# Patient Record
Sex: MALE | Race: WHITE | NOT HISPANIC OR LATINO | Employment: OTHER | URBAN - NONMETROPOLITAN AREA
[De-identification: names, ages, dates, MRNs, and addresses within clinical notes are randomized per-mention and may not be internally consistent; named-entity substitution may affect disease eponyms.]

---

## 2023-05-26 ENCOUNTER — HOSPITAL ENCOUNTER (OUTPATIENT)
Dept: DATA CONVERSION | Facility: HOSPITAL | Age: 58
End: 2023-05-26
Attending: PAIN MEDICINE | Admitting: PAIN MEDICINE
Payer: MEDICARE

## 2023-05-26 DIAGNOSIS — M54.17 RADICULOPATHY, LUMBOSACRAL REGION: ICD-10-CM

## 2023-05-26 DIAGNOSIS — R52 PAIN, UNSPECIFIED: ICD-10-CM

## 2023-09-07 VITALS — BODY MASS INDEX: 33.39 KG/M2 | WEIGHT: 200.4 LBS | HEIGHT: 65 IN

## 2023-10-01 PROBLEM — M19.012 ARTHROPATHY OF LEFT SHOULDER: Status: ACTIVE | Noted: 2023-10-01

## 2023-10-01 PROBLEM — G62.9 NEUROPATHY: Status: ACTIVE | Noted: 2023-10-01

## 2023-10-01 PROBLEM — R20.9 SENSORY DISTURBANCE: Status: ACTIVE | Noted: 2023-10-01

## 2023-10-01 PROBLEM — M77.12 LATERAL EPICONDYLITIS OF LEFT ELBOW: Status: ACTIVE | Noted: 2023-10-01

## 2023-10-01 PROBLEM — G56.03 BILATERAL CARPAL TUNNEL SYNDROME: Status: ACTIVE | Noted: 2023-10-01

## 2023-10-01 PROBLEM — K11.7 SALIVARY SECRETION DISTURBANCE: Status: ACTIVE | Noted: 2023-10-01

## 2023-10-01 PROBLEM — M54.17 LUMBOSACRAL RADICULOPATHY: Status: ACTIVE | Noted: 2023-10-01

## 2023-10-01 PROBLEM — M47.817 LUMBOSACRAL SPONDYLOSIS: Status: ACTIVE | Noted: 2023-10-01

## 2023-10-01 PROBLEM — M51.17 INTERVERTEBRAL DISC DISORDER WITH RADICULOPATHY OF LUMBOSACRAL REGION: Status: ACTIVE | Noted: 2023-10-01

## 2023-10-01 RX ORDER — DOXYCYCLINE 100 MG/1
CAPSULE ORAL
COMMUNITY
Start: 2023-02-06 | End: 2023-11-17 | Stop reason: ALTCHOICE

## 2023-10-01 RX ORDER — ALBUTEROL SULFATE 90 UG/1
AEROSOL, METERED RESPIRATORY (INHALATION)
COMMUNITY
Start: 2023-05-10 | End: 2024-02-05 | Stop reason: SDUPTHER

## 2023-10-01 RX ORDER — PREDNISONE 20 MG/1
TABLET ORAL
COMMUNITY
Start: 2023-05-02 | End: 2023-11-17 | Stop reason: ALTCHOICE

## 2023-10-01 RX ORDER — TAMSULOSIN HYDROCHLORIDE 0.4 MG/1
CAPSULE ORAL
COMMUNITY

## 2023-10-01 RX ORDER — PHENYLPROPANOLAMINE/CLEMASTINE 75-1.34MG
TABLET, EXTENDED RELEASE ORAL
COMMUNITY
End: 2024-01-02

## 2023-10-01 RX ORDER — FLUTICASONE PROPIONATE 50 MCG
SPRAY, SUSPENSION (ML) NASAL
COMMUNITY
Start: 2023-05-02 | End: 2023-11-17 | Stop reason: ALTCHOICE

## 2023-10-01 RX ORDER — AMOXICILLIN 500 MG/1
CAPSULE ORAL
COMMUNITY
Start: 2023-02-22 | End: 2023-11-17 | Stop reason: ALTCHOICE

## 2023-10-01 RX ORDER — ATORVASTATIN CALCIUM 10 MG/1
1 TABLET, FILM COATED ORAL DAILY
COMMUNITY

## 2023-10-02 NOTE — OP NOTE
PROCEDURE DETAILS    Preoperative Diagnosis:  Radiculopathy, lumbosacral region, M54.17    Postoperative Diagnosis:  Radiculopathy, lumbosacral region, M54.17    Surgeon: Korey Valdes  Resident/Fellow/Other Assistant: None of these were associated with this case    Procedure:  1.  BILATERAL L5 TFESI    Anesthesia: No anesthesiologist associated with this case  Estimated Blood Loss: 0  Findings: NA  Additional Details: The patient has a greater than 2-month history of severe low back and leg pain.  The patient has previously had 6 weeks of conservative management with exercise therapy  and medications.  The patient is compliant with home exercises for this issue.  The pain significantly interrupts the patient's physical function.  The patient does not desire spine surgery.  The patient had undergone a previous epidural injection at  the same levels and obtained greater than 60% pain relief and functional improvement for 5 months.  His imaging is notable for bilateral neuroforaminal stenosis at L5-S1 which correlates with his symptoms so the procedure was performed at those 2 levels.   He cannot sleep for longer than 4 hours, stand for an hour, lift heavy weights, or walk for a mile due to the pain        Operative Report:   Procedure: Bilateral lumbar transforaminal epidural steroid injection under fluoroscopic guidance of the left L5 nerve root at the left L5-S1 foramen and the  right L5 nerve root at the right L5-S1 foramen  Diagnosis: Lumbosacral radiculopathy  Solution used: 1 ml of Kenalog 40mg, 3 ml normal saline, 1 ml lidocaine 2%, 5ml total. 2.5 mL per site  Anesthesia: Local  Complications: None    After informed consent was obtained, the patient was brought to the OR and placed in the prone position. The area in question was prepped and draped  in sterile fashion. An ipsilateral oblique fluoroscopic view of the lumbar spine was obtained and after 3ml of lidocaine 1% was injected into the skin at  each site, a 22-gauge Chiba needle was inserted into the skin and advanced to the 6 clock position  beneath the left and right L5 pedicles under intermittent fluoroscopic guidance. Proper needle position was confirmed via both AP and lateral fluoroscopy.  1 mL Omnipaque was injected under live fluoroscopy at each site which demonstrated appropriate  epidural and nerve root uptake and the absence of any intravascular or intrathecal spread. The local anesthetic steroid solution was then injected incrementally at each site. The 2 needles were removed. Bleeding was nil. The patient tolerated the procedure  well and was transferred to the recovery room in good condition.                        Attestation:   Note Completion:  Attending Attestation I performed the procedure without a resident         Electronic Signatures:  Korey Valdes)  (Signed 26-May-2023 19:30)   Authored: Post-Operative Note, Chart Review, Note Completion      Last Updated: 26-May-2023 19:30 by Korey Valdes)

## 2023-10-03 ENCOUNTER — OFFICE VISIT (OUTPATIENT)
Dept: PAIN MEDICINE | Facility: CLINIC | Age: 58
End: 2023-10-03
Payer: MEDICARE

## 2023-10-03 VITALS
RESPIRATION RATE: 16 BRPM | BODY MASS INDEX: 27.02 KG/M2 | SYSTOLIC BLOOD PRESSURE: 166 MMHG | HEART RATE: 77 BPM | DIASTOLIC BLOOD PRESSURE: 88 MMHG | WEIGHT: 193 LBS | HEIGHT: 71 IN

## 2023-10-03 DIAGNOSIS — M19.012 ARTHRITIS OF LEFT SHOULDER REGION: Primary | ICD-10-CM

## 2023-10-03 DIAGNOSIS — M54.17 LUMBOSACRAL NEURITIS: ICD-10-CM

## 2023-10-03 PROCEDURE — 20610 DRAIN/INJ JOINT/BURSA W/O US: CPT | Performed by: PHYSICIAN ASSISTANT

## 2023-10-03 ASSESSMENT — ENCOUNTER SYMPTOMS
CARDIOVASCULAR NEGATIVE: 1
BACK PAIN: 1
HEMATOLOGIC/LYMPHATIC NEGATIVE: 1
RESPIRATORY NEGATIVE: 1
PSYCHIATRIC NEGATIVE: 1
ENDOCRINE NEGATIVE: 1
GASTROINTESTINAL NEGATIVE: 1
CONSTITUTIONAL NEGATIVE: 1
EYES NEGATIVE: 1
ALLERGIC/IMMUNOLOGIC NEGATIVE: 1
NEUROLOGICAL NEGATIVE: 1

## 2023-10-03 ASSESSMENT — PATIENT HEALTH QUESTIONNAIRE - PHQ9
2. FEELING DOWN, DEPRESSED OR HOPELESS: NOT AT ALL
SUM OF ALL RESPONSES TO PHQ9 QUESTIONS 1 AND 2: 0
1. LITTLE INTEREST OR PLEASURE IN DOING THINGS: NOT AT ALL

## 2023-10-03 ASSESSMENT — PAIN - FUNCTIONAL ASSESSMENT: PAIN_FUNCTIONAL_ASSESSMENT: 0-10

## 2023-10-03 ASSESSMENT — COLUMBIA-SUICIDE SEVERITY RATING SCALE - C-SSRS
1. IN THE PAST MONTH, HAVE YOU WISHED YOU WERE DEAD OR WISHED YOU COULD GO TO SLEEP AND NOT WAKE UP?: NO
6. HAVE YOU EVER DONE ANYTHING, STARTED TO DO ANYTHING, OR PREPARED TO DO ANYTHING TO END YOUR LIFE?: NO
2. HAVE YOU ACTUALLY HAD ANY THOUGHTS OF KILLING YOURSELF?: NO

## 2023-10-03 ASSESSMENT — PAIN DESCRIPTION - DESCRIPTORS: DESCRIPTORS: ACHING;BURNING;SHARP;STABBING;THROBBING

## 2023-10-03 ASSESSMENT — PAIN SCALES - GENERAL: PAINLEVEL_OUTOF10: 7

## 2023-10-03 NOTE — PROGRESS NOTES
Patient ID: Luis Felipe Moreno is a 58 y.o. male.    Joint Injection/Aspiration    Date/Time: 10/3/2023 4:00 PM    Performed by: Catherine Harrell PA-C  Authorized by: Catherine Harrell PA-C    Consent:     Consent obtained:  Verbal and written    Consent given by:  Patient    Risks, benefits, and alternatives were discussed: yes      Risks discussed:  Bleeding, infection and pain    Alternatives discussed:  Alternative treatment  Universal protocol:     Patient identity confirmed:  Verbally with patient  Location:     Location:  Shoulder    Shoulder:  L glenohumeral  Anesthesia:     Anesthesia method:  None  Procedure details:     Needle gauge:  22 G    Approach:  Posterior    Steroid injected: yes (Triamcinolone)    Post-procedure details:     Dressing:  Adhesive bandage    Procedure completion:  Tolerated

## 2023-10-03 NOTE — PROGRESS NOTES
Subjective   Patient ID: Luis Felipe Moreno is a 58 y.o. male who presents for Shoulder Pain (Patient is here today for left shoulder pain.  He is scheduled to get an injection in his left shoulder.  Patient states his pain radiates down his arm into his elbow.  Patient rates his pain a 7/10 today.  Patient denied numbness or tingling.  ).  AMIRA score 40.  Alcohol screen completed, negative.    HPI    Patient is a 58-year-old male.  He has a past medical history significant for lumbosacral neuritis, left shoulder pain and left shoulder arthritis.  Patient is hoping to get scheduled for his transforaminal that was ordered at his last appointment.  A bilateral L5-S1 transforaminal epidural steroid injection was ordered and is pending  He is here today to pursue his left shoulder injection.  He just wants to get feeling well for gun season.  The left shoulder pain he rates a 7/10.  He has difficulty lifting his arm.  Difficulty doing certain things because of his left shoulder pain and he just wants to get feeling better.  He wants to be able to be active.  It should be noted that he has used prednisone, ibuprofen, and done physical therapy in the past without relief.  Review of Systems   Constitutional: Negative.    HENT: Negative.     Eyes: Negative.    Respiratory: Negative.     Cardiovascular: Negative.    Gastrointestinal: Negative.    Endocrine: Negative.    Genitourinary: Negative.    Musculoskeletal:  Positive for back pain.   Allergic/Immunologic: Negative.    Neurological: Negative.    Hematological: Negative.    Psychiatric/Behavioral: Negative.         Objective   Physical Exam  Constitutional:       Appearance: Normal appearance. He is normal weight.   HENT:      Head: Normocephalic.      Right Ear: External ear normal.      Left Ear: External ear normal.      Nose: Nose normal.      Mouth/Throat:      Mouth: Mucous membranes are moist.   Eyes:      Pupils: Pupils are equal, round, and reactive to light.    Cardiovascular:      Rate and Rhythm: Normal rate and regular rhythm.      Pulses: Normal pulses.   Pulmonary:      Effort: Pulmonary effort is normal.      Breath sounds: Normal breath sounds.   Musculoskeletal:         General: Normal range of motion.      Cervical back: Normal range of motion.      Comments: Other than pain with internal and external rotation of the left shoulder.   Skin:     General: Skin is warm and dry.   Neurological:      General: No focal deficit present.      Mental Status: He is alert and oriented to person, place, and time.   Psychiatric:         Mood and Affect: Mood normal.         Behavior: Behavior normal.         Thought Content: Thought content normal.         Judgment: Judgment normal.       Left shoulder injection done today.  Patient tolerated well.    Left shoulder injection was done today.  Patient tolerated well.  He was brought to the room and placed in a seated position.  The area was in neutral position.  It was prepped and draped.  Needle was introduced into the shoulder.  Negative aspiration was performed and the above-mentioned medication was injected into the shoulder without problem.  At this time, a transforaminal epidural steroid injection is pending.  He is hopeful to pursue this in the near future.  Upon further investigation he is scheduled for this in November.  At this time, he should follow-up 2 to 3 weeks after the injection for reevaluation.  Call the clinic sooner if necessary.  Assessment/Plan

## 2023-10-11 RX ORDER — TRIAMCINOLONE ACETONIDE 0.25 MG/G
OINTMENT TOPICAL ONCE
Status: CANCELLED | OUTPATIENT
Start: 2023-10-11 | End: 2023-10-11

## 2023-10-11 RX ORDER — BUPIVACAINE HYDROCHLORIDE 2.5 MG/ML
INJECTION, SOLUTION INFILTRATION; PERINEURAL ONCE
Status: CANCELLED | OUTPATIENT
Start: 2023-10-11 | End: 2023-10-11

## 2023-10-11 RX ORDER — BUPIVACAINE HYDROCHLORIDE 2.5 MG/ML
3 INJECTION, SOLUTION INFILTRATION; PERINEURAL ONCE
Status: SHIPPED | OUTPATIENT
Start: 2023-10-11

## 2023-11-06 ENCOUNTER — TELEPHONE (OUTPATIENT)
Dept: PAIN MEDICINE | Facility: CLINIC | Age: 58
End: 2023-11-06
Payer: MEDICARE

## 2023-11-06 NOTE — TELEPHONE ENCOUNTER
I called Luis Felipe and left a VM educating him on  about his injection on 11/17/23, he is to hold his aspirin for 6 days before and his last dose on 11/10/23, I also reviewed no vitamins or supplements for 7 days before and last does will be on 11/9/23. I asked him to call me with any questions.

## 2023-11-07 DIAGNOSIS — M54.17 LUMBOSACRAL RADICULOPATHY: Primary | ICD-10-CM

## 2023-11-17 ENCOUNTER — HOSPITAL ENCOUNTER (OUTPATIENT)
Dept: OPERATING ROOM | Facility: HOSPITAL | Age: 58
Setting detail: OUTPATIENT SURGERY
Discharge: HOME | End: 2023-11-17
Payer: MEDICARE

## 2023-11-17 ENCOUNTER — HOSPITAL ENCOUNTER (OUTPATIENT)
Dept: RADIOLOGY | Facility: HOSPITAL | Age: 58
Setting detail: OUTPATIENT SURGERY
Discharge: HOME | End: 2023-11-17
Payer: MEDICARE

## 2023-11-17 VITALS
RESPIRATION RATE: 20 BRPM | WEIGHT: 190 LBS | DIASTOLIC BLOOD PRESSURE: 80 MMHG | OXYGEN SATURATION: 98 % | BODY MASS INDEX: 26.6 KG/M2 | TEMPERATURE: 97.5 F | SYSTOLIC BLOOD PRESSURE: 159 MMHG | HEIGHT: 71 IN | HEART RATE: 72 BPM

## 2023-11-17 DIAGNOSIS — M54.17 LUMBOSACRAL RADICULOPATHY: ICD-10-CM

## 2023-11-17 PROCEDURE — 77003 FLUOROGUIDE FOR SPINE INJECT: CPT

## 2023-11-17 PROCEDURE — 64483 NJX AA&/STRD TFRM EPI L/S 1: CPT | Mod: 50 | Performed by: ANESTHESIOLOGY

## 2023-11-17 PROCEDURE — 2550000001 HC RX 255 CONTRASTS

## 2023-11-17 PROCEDURE — 2500000004 HC RX 250 GENERAL PHARMACY W/ HCPCS (ALT 636 FOR OP/ED)

## 2023-11-17 PROCEDURE — A4216 STERILE WATER/SALINE, 10 ML: HCPCS

## 2023-11-17 PROCEDURE — 2500000005 HC RX 250 GENERAL PHARMACY W/O HCPCS

## 2023-11-17 ASSESSMENT — PAIN - FUNCTIONAL ASSESSMENT
PAIN_FUNCTIONAL_ASSESSMENT: 0-10
PAIN_FUNCTIONAL_ASSESSMENT: 0-10

## 2023-11-17 ASSESSMENT — PAIN DESCRIPTION - DESCRIPTORS: DESCRIPTORS: ACHING;BURNING;SHARP;STABBING

## 2023-11-17 ASSESSMENT — PAIN SCALES - GENERAL
PAINLEVEL_OUTOF10: 5 - MODERATE PAIN
PAINLEVEL_OUTOF10: 7

## 2023-11-17 NOTE — H&P
History Of Present Illness  Luis Felipe Moreno is a 58 y.o. male presenting with low back and bilateral leg pain due to lumbar radiculopathy.  Denies changes since last office visit.  Imaging studies reviewed.  Here for bilateral L5/S1 transforaminal epidural steroid injections.  Previous injections at these levels provided significant benefit for several months..     Past Medical History  Past Medical History:   Diagnosis Date    Contusion, laceration, and hemorrhage of brainstem with loss of consciousness status unknown, initial encounter (CMS/[facility])     Brain stem injury    Syringomyelia and syringobulbia (CMS/[facility]) 06/23/2017    Syringomyelia and syringobulbia    Unspecified injury of shoulder and upper arm, unspecified arm, initial encounter     Shoulder injury       Surgical History  Past Surgical History:   Procedure Laterality Date    CARPAL TUNNEL RELEASE  02/01/2017    Neuroplasty Median Nerve At Carpal Tunnel    KNEE ARTHROSCOPY W/ DEBRIDEMENT  02/01/2017    Arthroscopy Knee    OTHER SURGICAL HISTORY  02/01/2017    Nerve Block Transforaminal Epidural Lumbar L5 - S1    OTHER SURGICAL HISTORY  02/01/2017    History Of Prior Surgery    OTHER SURGICAL HISTORY  10/14/2022    Epidural steroid injection    OTHER SURGICAL HISTORY  10/14/2022    Epidural steroid injection    OTHER SURGICAL HISTORY  09/04/2020    Epidural steroid injection    OTHER SURGICAL HISTORY  01/22/2021    Epidural steroid injection    OTHER SURGICAL HISTORY  01/21/2022    Epidural steroid injection    OTHER SURGICAL HISTORY  05/22/2020    Epidural steroid injection    SHOULDER SURGERY  09/13/2017    Shoulder Surgery        Social History  He reports that he has been smoking cigarettes. He has a 35.00 pack-year smoking history. He has never used smokeless tobacco. He reports current alcohol use. He reports that he does not use drugs.    Family History  Family History   Problem Relation Name Age of Onset    Alcohol abuse Mother      Lung cancer  Father      Cancer Father          urinary bladder        Allergies  Patient has no known allergies.    Review of Systems   All other systems reviewed and are negative.       Physical Exam  General: No acute distress.  Alert and oriented x3.  Well-nourished.  Well-developed.  Neuro: 5/5 strength distal lower extremities.  Last Recorded Vitals  There were no vitals taken for this visit.    Relevant Results             Assessment/Plan       58-year-old gentleman with lumbar radiculopathy here for bilateral L5/S1 transforaminal epidural steroid injections    Vazquez Glover MD

## 2023-11-17 NOTE — Clinical Note
Pt arrived to OR on cart. Transferred to OR table. Safety strap applied. Pt padded with pillows in prone  position. Pt prepped with Chloraprep with a fire risk of 1. Bandaids applied to injection sites. Pt. Transferred to ACS on cart. Report given to Carmel Vazquez RN.

## 2023-11-17 NOTE — OP NOTE
* No procedures listed * Operative Note     Date: 2023  [address]    Name: Luis Felipe Moreno, : 1965, Age: 58 y.o., MRN: [id number], Sex: male    Diagnosis  Lumbosacral radiculitis    Procedures  Bilateral L5/S1 transforaminal epidural steroid injection    Surgeons   Adalberto  Procedure Summary  Anesthesia: Local ASA: ASA status not filed in the log.  Anesthesia Staff: No anesthesia staff entered.  Estimated Blood Loss: 0mL  Intra-op Medications: * Intraprocedure medication information is unavailable because the case start and end events have not been set *      Intraprocedure I/O Totals       None           Specimen: No specimens collected     Staff:   Circulator: Lindsay Nielson RN; Aleyda Crawford RN  Scrub Person: Elizabeth Prajapati RN; Adiel Sutton      Indications: Luis Felipe Moreno is an 58 y.o. male who is having surgery for lumbosacral radiculopathy.  The patient was seen in the preoperative area. The risks, benefits, complications, treatment options, non-operative alternatives, expected recovery and outcomes were discussed with the patient. The possibilities of reaction to medication, pulmonary aspiration, injury to surrounding structures, bleeding, recurrent infection, the need for additional procedures, failure to diagnose a condition, and creating a complication requiring transfusion or operation were discussed with the patient. The patient concurred with the proposed plan, giving informed consent.  The site of surgery was properly noted/marked if necessary per policy.   Procedure Details: The patient was identified in the preoperative holding area.  Risks, benefits, and alternatives were reviewed with the patient.  The patient wished to proceed.  Consent was signed and sites were marked.  The patient was brought to the operating room placed in the prone position with a pillow underneath the abdomen to reduce lumbar lordosis. Time out was performed.  The patient's skin was prepped  and draped in surgical sterile fashion.  Skin and subcutaneous tissues were anesthetized with total of 3 mL of 2% lidocaine through 25-gauge needle.  22-gauge spinal needles were advanced under intermittent fluoroscopy to the bilateral L5/S1 neuroforamina.  Confirmation of proper needle placement was performed under live fluoroscopy with 2 mL of Omnipaque contrast injected which showed appropriate epidural spread without vascular uptake.  After confirmation of negative aspiration, a total of 1 mL of 2% preservative free lidocaine, 1 mL preservative-free normal saline, and 10 mg of preservative-free dexamethasone were injected through the 2 needles in equally divided doses.  The needles were removed and bandages were applied.  The patient was brought to the recovery room in good condition with no apparent complications.    Complications:  None; patient tolerated the procedure well.    Disposition: PACU - hemodynamically stable.  Condition: stable       Vazquez Glover MD

## 2023-12-05 ENCOUNTER — APPOINTMENT (OUTPATIENT)
Dept: PRIMARY CARE | Facility: CLINIC | Age: 58
End: 2023-12-05
Payer: MEDICARE

## 2024-01-02 ENCOUNTER — APPOINTMENT (OUTPATIENT)
Dept: PRIMARY CARE | Facility: CLINIC | Age: 59
End: 2024-01-02
Payer: MEDICARE

## 2024-01-02 ENCOUNTER — OFFICE VISIT (OUTPATIENT)
Dept: PRIMARY CARE | Facility: CLINIC | Age: 59
End: 2024-01-02
Payer: MEDICARE

## 2024-01-02 VITALS
HEART RATE: 80 BPM | HEIGHT: 71 IN | SYSTOLIC BLOOD PRESSURE: 181 MMHG | WEIGHT: 196 LBS | BODY MASS INDEX: 27.44 KG/M2 | DIASTOLIC BLOOD PRESSURE: 75 MMHG

## 2024-01-02 DIAGNOSIS — E78.00 HYPERCHOLESTEROLEMIA: ICD-10-CM

## 2024-01-02 DIAGNOSIS — I10 PRIMARY HYPERTENSION: Primary | ICD-10-CM

## 2024-01-02 DIAGNOSIS — N40.0 BENIGN PROSTATIC HYPERPLASIA, UNSPECIFIED WHETHER LOWER URINARY TRACT SYMPTOMS PRESENT: ICD-10-CM

## 2024-01-02 DIAGNOSIS — J44.9 CHRONIC OBSTRUCTIVE PULMONARY DISEASE, UNSPECIFIED COPD TYPE (MULTI): ICD-10-CM

## 2024-01-02 PROBLEM — S43.402A SPRAIN OF LEFT SHOULDER: Status: RESOLVED | Noted: 2017-06-09 | Resolved: 2024-01-02

## 2024-01-02 PROBLEM — G56.23 ULNAR NEUROPATHY OF BOTH UPPER EXTREMITIES: Status: RESOLVED | Noted: 2017-06-02 | Resolved: 2024-01-02

## 2024-01-02 PROBLEM — M75.122 COMPLETE TEAR OF LEFT ROTATOR CUFF: Status: RESOLVED | Noted: 2017-07-27 | Resolved: 2024-01-02

## 2024-01-02 PROBLEM — Z98.890 S/P ARTHROSCOPY OF SHOULDER: Status: RESOLVED | Noted: 2017-07-27 | Resolved: 2024-01-02

## 2024-01-02 PROCEDURE — 3078F DIAST BP <80 MM HG: CPT | Performed by: INTERNAL MEDICINE

## 2024-01-02 PROCEDURE — 99204 OFFICE O/P NEW MOD 45 MIN: CPT | Performed by: INTERNAL MEDICINE

## 2024-01-02 PROCEDURE — 3077F SYST BP >= 140 MM HG: CPT | Performed by: INTERNAL MEDICINE

## 2024-01-02 RX ORDER — LISINOPRIL 5 MG/1
5 TABLET ORAL DAILY
Qty: 30 TABLET | Refills: 11 | Status: SHIPPED | OUTPATIENT
Start: 2024-01-02 | End: 2024-02-05 | Stop reason: SDUPTHER

## 2024-01-02 ASSESSMENT — ENCOUNTER SYMPTOMS
DYSURIA: 0
ADENOPATHY: 0
COUGH: 0
ABDOMINAL PAIN: 0
NECK STIFFNESS: 0
ABDOMINAL DISTENTION: 0
CARDIOVASCULAR NEGATIVE: 1
NAUSEA: 0
GASTROINTESTINAL NEGATIVE: 1
MUSCULOSKELETAL NEGATIVE: 1
CONFUSION: 0
FEVER: 0
CHILLS: 0
NUMBNESS: 0
BACK PAIN: 0
HEMATOLOGIC/LYMPHATIC NEGATIVE: 1
LIGHT-HEADEDNESS: 0
HEADACHES: 0
CONSTITUTIONAL NEGATIVE: 1
NEUROLOGICAL NEGATIVE: 1
AGITATION: 0
PALPITATIONS: 0
EYE DISCHARGE: 0
WHEEZING: 0
JOINT SWELLING: 0
ENDOCRINE NEGATIVE: 1
DIARRHEA: 0
ALLERGIC/IMMUNOLOGIC NEGATIVE: 1
VOMITING: 0
CONSTIPATION: 0
PSYCHIATRIC NEGATIVE: 1
RESPIRATORY NEGATIVE: 1
EYES NEGATIVE: 1
SHORTNESS OF BREATH: 0

## 2024-01-02 NOTE — PROGRESS NOTES
Subjective   Patient ID: Luis Felipe Moreno is a 58 y.o. male who presents for Follow-up (New patient visit).  Here to establish  Feels fine        Review of Systems   Constitutional: Negative.  Negative for chills and fever.   HENT: Negative.  Negative for congestion.    Eyes: Negative.  Negative for discharge.   Respiratory: Negative.  Negative for cough, shortness of breath and wheezing.    Cardiovascular: Negative.  Negative for chest pain, palpitations and leg swelling.   Gastrointestinal: Negative.  Negative for abdominal distention, abdominal pain, constipation, diarrhea, nausea and vomiting.   Endocrine: Negative.    Genitourinary: Negative.  Negative for dysuria and urgency.   Musculoskeletal: Negative.  Negative for back pain, joint swelling and neck stiffness.   Skin: Negative.  Negative for rash.   Allergic/Immunologic: Negative.  Negative for immunocompromised state.   Neurological: Negative.  Negative for light-headedness, numbness and headaches.   Hematological: Negative.  Negative for adenopathy.   Psychiatric/Behavioral: Negative.  Negative for agitation, behavioral problems and confusion.    All other systems reviewed and are negative.      Objective   Physical Exam  Vitals reviewed.   Constitutional:       General: He is not in acute distress.     Appearance: Normal appearance.   HENT:      Head: Normocephalic and atraumatic.      Nose: Nose normal.   Eyes:      Conjunctiva/sclera: Conjunctivae normal.      Pupils: Pupils are equal, round, and reactive to light.   Neck:      Vascular: No carotid bruit.   Cardiovascular:      Rate and Rhythm: Normal rate and regular rhythm.      Pulses: Normal pulses.      Heart sounds:      No gallop.   Pulmonary:      Effort: Pulmonary effort is normal. No respiratory distress.      Breath sounds: Normal breath sounds. No wheezing.   Abdominal:      General: Bowel sounds are normal.      Palpations: Abdomen is soft.      Tenderness: There is no abdominal tenderness.  "  Musculoskeletal:         General: Normal range of motion.      Cervical back: Normal range of motion. No rigidity.   Lymphadenopathy:      Cervical: No cervical adenopathy.   Skin:     General: Skin is warm.      Findings: No rash.   Neurological:      General: No focal deficit present.      Mental Status: He is alert and oriented to person, place, and time.   Psychiatric:         Mood and Affect: Mood normal.         Behavior: Behavior normal.       BP (!) 181/75 (BP Location: Left arm)   Pulse 80   Ht 1.803 m (5' 11\")   Wt 88.9 kg (196 lb)   BMI 27.34 kg/m²    No results found for: \"CBCDIF\", \"CMPLAS\", \"PSA\", \"LASA\", \"HGBA1C\"  Assessment/Plan   Problem List Items Addressed This Visit       Primary hypertension - Primary    Relevant Medications    lisinopril 5 mg tablet    Other Relevant Orders    CBC and Auto Differential    Comprehensive Metabolic Panel    Benign prostatic hyperplasia    Relevant Orders    CBC and Auto Differential    Comprehensive Metabolic Panel    Hypercholesterolemia    Relevant Orders    CBC and Auto Differential    Comprehensive Metabolic Panel    Chronic obstructive pulmonary disease (CMS/HCC)    Relevant Orders    CBC and Auto Differential    Comprehensive Metabolic Panel    Complete Pulmonary Function Test Pre/Post Bronchodialator (Spirometry Pre/Post/DLCO/Lung Volumes)    XR chest 2 views      MONITOR BP   GOAL BP LOWER THAN 130/80  LOW SALT  EXERCISE DAILY    I have counselled patient about need for smoking/tobacco cessation and how I can support efforts when patient is ready to quit.  Discussed nicotine replacement therapy, Varenicline, Bupropion, hypnosis, support groups, and acupuncture as potential options.  I spend 4 minutes counseling.    Pt reports last colonoscopy 6 years ago normal      MDM    1) COMPLEXITY: 1 OR MORE CHRONIC CONDITION WITH EXACERBATION, OR PROGRESSION OR SIDE EFFECT OF TREATMENT ADDRESSED  2)DATA: TESTS INTERPRETED AND OR ORDERED, TOOK INDEPENDENT " HISTORY OR RECORDS REVIEWED  3)RISK: MODERATE RISK DUE TO NATURE OF MEDICAL CONDITIONS/COMORBIDITY OR MEDICATIONS ORDERED OR SURGICAL OR PROCEDURE REFERRAL, .

## 2024-01-05 ENCOUNTER — HOSPITAL ENCOUNTER (OUTPATIENT)
Dept: RADIOLOGY | Facility: HOSPITAL | Age: 59
Discharge: HOME | End: 2024-01-05
Payer: MEDICARE

## 2024-01-05 ENCOUNTER — HOSPITAL ENCOUNTER (OUTPATIENT)
Dept: RESPIRATORY THERAPY | Facility: HOSPITAL | Age: 59
Discharge: HOME | End: 2024-01-05
Payer: MEDICARE

## 2024-01-05 DIAGNOSIS — J44.9 CHRONIC OBSTRUCTIVE PULMONARY DISEASE, UNSPECIFIED COPD TYPE (MULTI): ICD-10-CM

## 2024-01-05 PROCEDURE — 71046 X-RAY EXAM CHEST 2 VIEWS: CPT | Performed by: RADIOLOGY

## 2024-01-05 PROCEDURE — 71046 X-RAY EXAM CHEST 2 VIEWS: CPT

## 2024-01-09 ENCOUNTER — OUTSIDE PROCEDURE (OUTPATIENT)
Dept: PRIMARY CARE | Facility: CLINIC | Age: 59
End: 2024-01-09
Payer: MEDICARE

## 2024-01-09 DIAGNOSIS — J44.9 CHRONIC OBSTRUCTIVE PULMONARY DISEASE, UNSPECIFIED COPD TYPE (MULTI): Primary | ICD-10-CM

## 2024-01-09 PROCEDURE — 94060 EVALUATION OF WHEEZING: CPT | Performed by: INTERNAL MEDICINE

## 2024-02-05 ENCOUNTER — OFFICE VISIT (OUTPATIENT)
Dept: PRIMARY CARE | Facility: CLINIC | Age: 59
End: 2024-02-05
Payer: MEDICARE

## 2024-02-05 VITALS
WEIGHT: 192 LBS | HEART RATE: 69 BPM | SYSTOLIC BLOOD PRESSURE: 159 MMHG | DIASTOLIC BLOOD PRESSURE: 84 MMHG | HEIGHT: 71 IN | BODY MASS INDEX: 26.88 KG/M2

## 2024-02-05 DIAGNOSIS — J44.9 CHRONIC OBSTRUCTIVE PULMONARY DISEASE, UNSPECIFIED COPD TYPE (MULTI): ICD-10-CM

## 2024-02-05 DIAGNOSIS — E78.00 HYPERCHOLESTEROLEMIA: ICD-10-CM

## 2024-02-05 DIAGNOSIS — Z00.00 ROUTINE GENERAL MEDICAL EXAMINATION AT HEALTH CARE FACILITY: Primary | ICD-10-CM

## 2024-02-05 DIAGNOSIS — I10 PRIMARY HYPERTENSION: ICD-10-CM

## 2024-02-05 DIAGNOSIS — G47.00 INSOMNIA, UNSPECIFIED TYPE: ICD-10-CM

## 2024-02-05 DIAGNOSIS — Z12.5 SPECIAL SCREENING FOR MALIGNANT NEOPLASM OF PROSTATE: ICD-10-CM

## 2024-02-05 DIAGNOSIS — Z23 NEED FOR VACCINATION: ICD-10-CM

## 2024-02-05 DIAGNOSIS — Z00.00 HEALTHCARE MAINTENANCE: ICD-10-CM

## 2024-02-05 PROCEDURE — 3079F DIAST BP 80-89 MM HG: CPT | Performed by: INTERNAL MEDICINE

## 2024-02-05 PROCEDURE — 99214 OFFICE O/P EST MOD 30 MIN: CPT | Performed by: INTERNAL MEDICINE

## 2024-02-05 PROCEDURE — 3077F SYST BP >= 140 MM HG: CPT | Performed by: INTERNAL MEDICINE

## 2024-02-05 PROCEDURE — G0439 PPPS, SUBSEQ VISIT: HCPCS | Performed by: INTERNAL MEDICINE

## 2024-02-05 RX ORDER — UMECLIDINIUM BROMIDE AND VILANTEROL TRIFENATATE 62.5; 25 UG/1; UG/1
1 POWDER RESPIRATORY (INHALATION) DAILY
Qty: 30 EACH | Refills: 11 | Status: SHIPPED | OUTPATIENT
Start: 2024-02-05 | End: 2025-02-04

## 2024-02-05 RX ORDER — LISINOPRIL 10 MG/1
10 TABLET ORAL DAILY
Qty: 90 TABLET | Refills: 3 | Status: SHIPPED | OUTPATIENT
Start: 2024-02-05 | End: 2025-02-04

## 2024-02-05 RX ORDER — AMITRIPTYLINE HYDROCHLORIDE 10 MG/1
10 TABLET, FILM COATED ORAL NIGHTLY
Qty: 30 TABLET | Refills: 11 | Status: SHIPPED | OUTPATIENT
Start: 2024-02-05 | End: 2024-04-16 | Stop reason: SDUPTHER

## 2024-02-05 RX ORDER — ALBUTEROL SULFATE 90 UG/1
2 AEROSOL, METERED RESPIRATORY (INHALATION) EVERY 6 HOURS PRN
Qty: 18 G | Refills: 11 | Status: SHIPPED | OUTPATIENT
Start: 2024-02-05 | End: 2025-02-04

## 2024-02-05 ASSESSMENT — ENCOUNTER SYMPTOMS
NECK STIFFNESS: 0
CONSTITUTIONAL NEGATIVE: 1
VOMITING: 0
GASTROINTESTINAL NEGATIVE: 1
HEMATOLOGIC/LYMPHATIC NEGATIVE: 1
CONFUSION: 0
NAUSEA: 0
ABDOMINAL PAIN: 0
JOINT SWELLING: 0
NUMBNESS: 0
ENDOCRINE NEGATIVE: 1
DYSURIA: 0
RESPIRATORY NEGATIVE: 1
CARDIOVASCULAR NEGATIVE: 1
BACK PAIN: 0
AGITATION: 0
HEADACHES: 0
PALPITATIONS: 0
SHORTNESS OF BREATH: 0
EYE DISCHARGE: 0
EYES NEGATIVE: 1
MUSCULOSKELETAL NEGATIVE: 1
ADENOPATHY: 0
NEUROLOGICAL NEGATIVE: 1
FEVER: 0
ABDOMINAL DISTENTION: 0
CHILLS: 0
ALLERGIC/IMMUNOLOGIC NEGATIVE: 1
CONSTIPATION: 0
WHEEZING: 0
LIGHT-HEADEDNESS: 0
DIARRHEA: 0
PSYCHIATRIC NEGATIVE: 1
COUGH: 0

## 2024-02-05 ASSESSMENT — ACTIVITIES OF DAILY LIVING (ADL)
DOING_HOUSEWORK: INDEPENDENT
BATHING: INDEPENDENT
DRESSING: INDEPENDENT
TAKING_MEDICATION: INDEPENDENT
GROCERY_SHOPPING: INDEPENDENT
MANAGING_FINANCES: INDEPENDENT

## 2024-02-05 ASSESSMENT — PATIENT HEALTH QUESTIONNAIRE - PHQ9
1. LITTLE INTEREST OR PLEASURE IN DOING THINGS: NOT AT ALL
SUM OF ALL RESPONSES TO PHQ9 QUESTIONS 1 AND 2: 0
2. FEELING DOWN, DEPRESSED OR HOPELESS: NOT AT ALL
SUM OF ALL RESPONSES TO PHQ9 QUESTIONS 1 AND 2: 0
1. LITTLE INTEREST OR PLEASURE IN DOING THINGS: NOT AT ALL
2. FEELING DOWN, DEPRESSED OR HOPELESS: NOT AT ALL

## 2024-02-05 NOTE — PROGRESS NOTES
"Subjective   Reason for Visit: Luis Felipe Moreno is an 59 y.o. male here for a Medicare Wellness visit.     Past Medical, Surgical, and Family History reviewed and updated in chart.    Reviewed all medications by prescribing practitioner or clinical pharmacist (such as prescriptions, OTCs, herbal therapies and supplements) and documented in the medical record.    Here for fu with tests   Co insomnia    Wellness exam        Patient Care Team:  Alessandro Martin MD as PCP - General (Internal Medicine)     Review of Systems   Constitutional: Negative.  Negative for chills and fever.   HENT: Negative.  Negative for congestion.    Eyes: Negative.  Negative for discharge.   Respiratory: Negative.  Negative for cough, shortness of breath and wheezing.    Cardiovascular: Negative.  Negative for chest pain, palpitations and leg swelling.   Gastrointestinal: Negative.  Negative for abdominal distention, abdominal pain, constipation, diarrhea, nausea and vomiting.   Endocrine: Negative.    Genitourinary: Negative.  Negative for dysuria and urgency.   Musculoskeletal: Negative.  Negative for back pain, joint swelling and neck stiffness.   Skin: Negative.  Negative for rash.   Allergic/Immunologic: Negative.  Negative for immunocompromised state.   Neurological: Negative.  Negative for light-headedness, numbness and headaches.   Hematological: Negative.  Negative for adenopathy.   Psychiatric/Behavioral: Negative.  Negative for agitation, behavioral problems and confusion.    All other systems reviewed and are negative.      Objective   Vitals:  /84 (BP Location: Left arm, Patient Position: Sitting)   Pulse 69   Ht 1.803 m (5' 11\")   Wt 87.1 kg (192 lb)   BMI 26.78 kg/m²       Physical Exam  Vitals reviewed.   Constitutional:       General: He is not in acute distress.     Appearance: Normal appearance.   HENT:      Head: Normocephalic and atraumatic.      Nose: Nose normal.   Eyes:      Conjunctiva/sclera: Conjunctivae " normal.      Pupils: Pupils are equal, round, and reactive to light.   Neck:      Vascular: No carotid bruit.   Cardiovascular:      Rate and Rhythm: Normal rate and regular rhythm.      Pulses: Normal pulses.      Heart sounds:      No gallop.   Pulmonary:      Effort: Pulmonary effort is normal. No respiratory distress.      Breath sounds: Normal breath sounds. No wheezing.   Abdominal:      General: Bowel sounds are normal.      Palpations: Abdomen is soft.      Tenderness: There is no abdominal tenderness.   Musculoskeletal:         General: Normal range of motion.      Cervical back: Normal range of motion. No rigidity.   Lymphadenopathy:      Cervical: No cervical adenopathy.   Skin:     General: Skin is warm.      Findings: No rash.   Neurological:      General: No focal deficit present.      Mental Status: He is alert and oriented to person, place, and time.   Psychiatric:         Mood and Affect: Mood normal.         Behavior: Behavior normal.         Assessment/Plan   Problem List Items Addressed This Visit       Primary hypertension    Relevant Medications    lisinopril 10 mg tablet    Other Relevant Orders    CBC and Auto Differential    Comprehensive Metabolic Panel    Hypercholesterolemia    Relevant Orders    CBC and Auto Differential    Comprehensive Metabolic Panel    Lipid Panel    Chronic obstructive pulmonary disease (CMS/HCC)    Relevant Medications    albuterol 90 mcg/actuation inhaler    umeclidinium-vilanteroL (Anoro Ellipta) 62.5-25 mcg/actuation blister with device    Other Relevant Orders    CBC and Auto Differential    Comprehensive Metabolic Panel     Other Visit Diagnoses       Routine general medical examination at health care facility    -  Primary    Relevant Orders    CBC and Auto Differential    Comprehensive Metabolic Panel    Special screening for malignant neoplasm of prostate        Relevant Orders    Prostate Specific Antigen, Screen    Insomnia, unspecified type         Relevant Medications    amitriptyline (Elavil) 10 mg tablet    Healthcare maintenance        Need for vaccination        Relevant Medications    pneumoc 20-alexus conj-dip cr,PF, (Prevnar) 0.5 mL vaccine          MONITOR BP   GOAL BP LOWER THAN 130/80  LOW SALT  EXERCISE DAILY    CXR negative    PFT dw pt    I have counselled patient about need for smoking/tobacco cessation and how I can support efforts when patient is ready to quit.  Discussed nicotine replacement therapy, Varenicline, Bupropion, hypnosis, support groups, and acupuncture as potential options.  I spend 4 minutes counseling.    Has tried cgantix did not do good with it    Last colonoscopy 5 years ago normal per pt      MDM    1) COMPLEXITY: 1 OR MORE CHRONIC CONDITION WITH EXACERBATION, OR PROGRESSION OR SIDE EFFECT OF TREATMENT ADDRESSED  2)DATA: TESTS INTERPRETED AND OR ORDERED, TOOK INDEPENDENT HISTORY OR RECORDS REVIEWED  3)RISK: MODERATE RISK DUE TO NATURE OF MEDICAL CONDITIONS/COMORBIDITY OR MEDICATIONS ORDERED OR SURGICAL OR PROCEDURE REFERRAL, .      Fu 6 mo bw

## 2024-02-08 ENCOUNTER — OFFICE VISIT (OUTPATIENT)
Dept: PAIN MEDICINE | Facility: CLINIC | Age: 59
End: 2024-02-08
Payer: MEDICARE

## 2024-02-08 VITALS
WEIGHT: 193 LBS | SYSTOLIC BLOOD PRESSURE: 134 MMHG | BODY MASS INDEX: 26.92 KG/M2 | DIASTOLIC BLOOD PRESSURE: 82 MMHG | RESPIRATION RATE: 20 BRPM | HEART RATE: 66 BPM

## 2024-02-08 DIAGNOSIS — M19.012 ARTHROPATHY OF LEFT SHOULDER: Primary | ICD-10-CM

## 2024-02-08 DIAGNOSIS — M51.17 INTERVERTEBRAL DISC DISORDER WITH RADICULOPATHY OF LUMBOSACRAL REGION: ICD-10-CM

## 2024-02-08 DIAGNOSIS — M54.17 LUMBOSACRAL RADICULOPATHY: ICD-10-CM

## 2024-02-08 PROCEDURE — 20610 DRAIN/INJ JOINT/BURSA W/O US: CPT | Performed by: PHYSICIAN ASSISTANT

## 2024-02-08 PROCEDURE — 2500000004 HC RX 250 GENERAL PHARMACY W/ HCPCS (ALT 636 FOR OP/ED): Performed by: PHYSICIAN ASSISTANT

## 2024-02-08 RX ORDER — BUPIVACAINE HYDROCHLORIDE 5 MG/ML
3 INJECTION, SOLUTION EPIDURAL; INTRACAUDAL ONCE
Status: COMPLETED | OUTPATIENT
Start: 2024-02-08 | End: 2024-02-08

## 2024-02-08 RX ORDER — TRIAMCINOLONE ACETONIDE 40 MG/ML
40 INJECTION, SUSPENSION INTRA-ARTICULAR; INTRAMUSCULAR ONCE
Status: COMPLETED | OUTPATIENT
Start: 2024-02-08 | End: 2024-02-08

## 2024-02-08 RX ADMIN — BUPIVACAINE HYDROCHLORIDE 15 MG: 5 INJECTION, SOLUTION EPIDURAL; INTRACAUDAL; PERINEURAL at 08:19

## 2024-02-08 RX ADMIN — TRIAMCINOLONE ACETONIDE 40 MG: 40 INJECTION, SUSPENSION INTRA-ARTICULAR; INTRAMUSCULAR at 08:20

## 2024-02-08 ASSESSMENT — ENCOUNTER SYMPTOMS
BACK PAIN: 1
ALLERGIC/IMMUNOLOGIC NEGATIVE: 1
EYES NEGATIVE: 1
RESPIRATORY NEGATIVE: 1
ENDOCRINE NEGATIVE: 1
PSYCHIATRIC NEGATIVE: 1
GASTROINTESTINAL NEGATIVE: 1
ARTHRALGIAS: 1
HEMATOLOGIC/LYMPHATIC NEGATIVE: 1
CONSTITUTIONAL NEGATIVE: 1
CARDIOVASCULAR NEGATIVE: 1

## 2024-02-08 ASSESSMENT — COLUMBIA-SUICIDE SEVERITY RATING SCALE - C-SSRS
6. HAVE YOU EVER DONE ANYTHING, STARTED TO DO ANYTHING, OR PREPARED TO DO ANYTHING TO END YOUR LIFE?: NO
2. HAVE YOU ACTUALLY HAD ANY THOUGHTS OF KILLING YOURSELF?: NO
1. IN THE PAST MONTH, HAVE YOU WISHED YOU WERE DEAD OR WISHED YOU COULD GO TO SLEEP AND NOT WAKE UP?: NO

## 2024-02-08 NOTE — PROGRESS NOTES
Subjective   Patient ID: Luis Felipe Moreno is a 59 y.o. male who presents for Shoulder Pain (ONGOING LEFT SHOULDER PAIN, ACHING,THROBBING PAIN, TAKING ADVIL DAILY, HE STATES THE INJECTIONS IN HIS SHOULDERS FOR ABOUT 3 MONTHS, PAIN SCORE) 6/10,AMIRA=42% DEP-, SMOKING ED GIVEN    Sherri Pike CMA 02/08/24 8:07 AM     Patient is a 59-year-old male.  He has a past medical history significant for lumbosacral neuritis, left shoulder pain and left shoulder arthritis.    He presents today for follow-up after undergoing a left shoulder injection on 10/3/2023 that gave him 70 to 80% relief for 3 months.  Patient states over the last few weeks he has noticed that the left shoulder pain is very tender.  He currently rates the discomfort as 6/10  Patient also underwent bilateral L5-S1 transforaminal epidural steroid injection done on 11/17/2023 that gave him 60+ percent relief.  He still has 60+ percent relief at this time for the his lower back and radicular symptoms  At this time, his main concern is his left shoulder pain as he is noticing this pain again.  He is having difficulty lifting his arm and difficulty doing certain things because of the left shoulder.      It should be noted that he has used prednisone, ibuprofen, and done physical therapy in the past without relief.        Review of Systems   Constitutional: Negative.    HENT: Negative.     Eyes: Negative.    Respiratory: Negative.     Cardiovascular: Negative.    Gastrointestinal: Negative.    Endocrine: Negative.    Genitourinary: Negative.    Musculoskeletal:  Positive for arthralgias, back pain and gait problem.   Skin: Negative.    Allergic/Immunologic: Negative.    Hematological: Negative.    Psychiatric/Behavioral: Negative.         Objective   Physical Exam  Vitals and nursing note reviewed.   Constitutional:       Appearance: Normal appearance.   HENT:      Head: Normocephalic and atraumatic.      Right Ear: External ear normal.      Left Ear: External ear  normal.      Nose: Nose normal.      Mouth/Throat:      Pharynx: Oropharynx is clear.   Eyes:      Pupils: Pupils are equal, round, and reactive to light.   Cardiovascular:      Rate and Rhythm: Normal rate and regular rhythm.      Pulses: Normal pulses.      Heart sounds: Normal heart sounds.   Pulmonary:      Effort: Pulmonary effort is normal.   Musculoskeletal:         General: Normal range of motion.      Cervical back: Normal range of motion.      Comments: 5/5 upper and lower extremity strength other than left shoulder which has diminished range of motion due to pain.  Pain with internal and external rotation   Skin:     General: Skin is warm and dry.   Neurological:      General: No focal deficit present.      Mental Status: He is alert and oriented to person, place, and time. Mental status is at baseline.   Psychiatric:         Mood and Affect: Mood normal.         Behavior: Behavior normal.         Thought Content: Thought content normal.         Judgment: Judgment normal.         Assessment/Plan   Diagnoses and all orders for this visit:  Arthropathy of left shoulder  -     triamcinolone acetonide (Kenalog-40) injection 40 mg  -     bupivacaine PF (Marcaine) 0.5 % (5 mg/mL) injection 15 mg  Lumbosacral radiculopathy  Intervertebral disc disorder with radiculopathy of lumbosacral region       Patient is a 59-year-old male.  He has a past medical history significant for lumbosacral neuritis, left shoulder pain and left shoulder arthritis.  Previous left shoulder injection on 10/3/2023 that gave him 70 to 80% relief for 3 months.  Patient would like to repeat this today  Patient also underwent bilateral L5-S1 transforaminal epidural steroid injection done on 11/17/2023 that gave him 60+ percent relief.  He still has 60+ percent relief at this time for the his lower back and radicular symptoms  He states that the left shoulder pain keeps him up at night.  We will pursue a repeat injection.  Please refer to  separate portion of this report.  He will follow-up in 2 to 3 months.  Call clinic sooner if necessary.

## 2024-02-08 NOTE — PROGRESS NOTES
Patient ID: Luis Felipe Moreno is a 59 y.o. male.    Joint Injection/Aspiration    Date/Time: 2/8/2024 8:25 AM    Performed by: Catherine Harrell PA-C  Authorized by: Catherine Harrell PA-C    Consent:     Consent obtained:  Written    Consent given by:  Patient    Risks, benefits, and alternatives were discussed: yes      Risks discussed:  Bleeding, infection and pain    Alternatives discussed:  No treatment, delayed treatment, alternative treatment and observation  Universal protocol:     Patient identity confirmed:  Verbally with patient  Location:     Location:  Shoulder    Shoulder:  L glenohumeral  Anesthesia:     Anesthesia method:  None  Procedure details:     Needle gauge:  20 G    Approach:  Posterior    Steroid injected: yes      Specimen collected: no    Post-procedure details:     Dressing:  Adhesive bandage    Procedure completion:  Tolerated well, no immediate complications  Comments:      3 mL of 0.5% bupivacaine and 40 mg of triamcinolone was injected into the left shoulder without problem.

## 2024-04-16 ENCOUNTER — PREP FOR PROCEDURE (OUTPATIENT)
Dept: PAIN MEDICINE | Facility: CLINIC | Age: 59
End: 2024-04-16

## 2024-04-16 ENCOUNTER — OFFICE VISIT (OUTPATIENT)
Dept: PAIN MEDICINE | Facility: CLINIC | Age: 59
End: 2024-04-16
Payer: MEDICARE

## 2024-04-16 VITALS
SYSTOLIC BLOOD PRESSURE: 144 MMHG | WEIGHT: 189 LBS | HEART RATE: 77 BPM | BODY MASS INDEX: 26.36 KG/M2 | DIASTOLIC BLOOD PRESSURE: 78 MMHG

## 2024-04-16 DIAGNOSIS — M47.27 OSTEOARTHRITIS OF SPINE WITH RADICULOPATHY, LUMBOSACRAL REGION: ICD-10-CM

## 2024-04-16 DIAGNOSIS — G47.00 INSOMNIA, UNSPECIFIED TYPE: ICD-10-CM

## 2024-04-16 DIAGNOSIS — M54.17 LUMBOSACRAL RADICULOPATHY: Primary | ICD-10-CM

## 2024-04-16 DIAGNOSIS — M19.012 ARTHROPATHY OF LEFT SHOULDER: ICD-10-CM

## 2024-04-16 PROCEDURE — 99214 OFFICE O/P EST MOD 30 MIN: CPT | Performed by: PHYSICIAN ASSISTANT

## 2024-04-16 RX ORDER — BUPIVACAINE HYDROCHLORIDE 2.5 MG/ML
2 INJECTION, SOLUTION EPIDURAL; INFILTRATION; INTRACAUDAL ONCE
Status: CANCELLED | OUTPATIENT
Start: 2024-06-07 | End: 2024-04-16

## 2024-04-16 RX ORDER — DEXAMETHASONE SODIUM PHOSPHATE 10 MG/ML
10 INJECTION INTRAMUSCULAR; INTRAVENOUS ONCE
Status: CANCELLED | OUTPATIENT
Start: 2024-06-07 | End: 2024-04-16

## 2024-04-16 RX ORDER — AMITRIPTYLINE HYDROCHLORIDE 50 MG/1
50 TABLET, FILM COATED ORAL NIGHTLY
Qty: 30 TABLET | Refills: 1 | Status: SHIPPED | OUTPATIENT
Start: 2024-04-16

## 2024-04-16 RX ORDER — LIDOCAINE HYDROCHLORIDE 10 MG/ML
10 INJECTION, SOLUTION EPIDURAL; INFILTRATION; INTRACAUDAL; PERINEURAL ONCE
Status: CANCELLED | OUTPATIENT
Start: 2024-06-07 | End: 2024-04-16

## 2024-04-16 ASSESSMENT — PATIENT HEALTH QUESTIONNAIRE - PHQ9
2. FEELING DOWN, DEPRESSED OR HOPELESS: NOT AT ALL
1. LITTLE INTEREST OR PLEASURE IN DOING THINGS: NOT AT ALL
SUM OF ALL RESPONSES TO PHQ9 QUESTIONS 1 AND 2: 0

## 2024-04-16 ASSESSMENT — ENCOUNTER SYMPTOMS
CARDIOVASCULAR NEGATIVE: 1
WEAKNESS: 1
PSYCHIATRIC NEGATIVE: 1
EYES NEGATIVE: 1
CONSTITUTIONAL NEGATIVE: 1
BACK PAIN: 1
MYALGIAS: 1
ALLERGIC/IMMUNOLOGIC NEGATIVE: 1
RESPIRATORY NEGATIVE: 1
HEMATOLOGIC/LYMPHATIC NEGATIVE: 1
GASTROINTESTINAL NEGATIVE: 1
ENDOCRINE NEGATIVE: 1
NUMBNESS: 1
ARTHRALGIAS: 1

## 2024-04-16 ASSESSMENT — COLUMBIA-SUICIDE SEVERITY RATING SCALE - C-SSRS
6. HAVE YOU EVER DONE ANYTHING, STARTED TO DO ANYTHING, OR PREPARED TO DO ANYTHING TO END YOUR LIFE?: NO
1. IN THE PAST MONTH, HAVE YOU WISHED YOU WERE DEAD OR WISHED YOU COULD GO TO SLEEP AND NOT WAKE UP?: NO
2. HAVE YOU ACTUALLY HAD ANY THOUGHTS OF KILLING YOURSELF?: NO

## 2024-04-16 NOTE — PROGRESS NOTES
Subjective   Patient ID: Luis Felipe Moreno is a 59 y.o. male who presents for Shoulder Pain (FOLLOW UP LEFT SHOULDER INJECTION FROM FEB, HE GOT GREAT RELIEF FOR 1 1/2 MONTHS, HE STATES SOMETHING MOVED IN HIS SHOULDER THAT FEELS LIKE CUTTING, TAKING ADVIL DAILY, HE DESCRIBES PAIN TO BE SHARP, PAIN WITH LIFTING,GRIPPING, PAIN WAKES HIM AT NIGHT, ). PAIN SCORE 8/10, AMIRA=54%, ETOH NO    Sherri Pike, CMA 04/16/24 8:01 AM     Patient is a 59-year-old male.  He has a past medical history significant for lumbosacral neuritis, left shoulder pain and left shoulder arthritis.  He presents today for follow-up after undergoing left shoulder injection on 2/8/2024 that gave him very significant relief for the first 6 weeks but over the last few weeks he has noticed that the pain has started to return.  He still has 50% relief at this time but wants to stay on top of this and get another shoulder injection in the future when he can.  At this time he rates his discomfort and 8/10.  It is in the shoulder and lower back going down the legs.  Patient also underwent bilateral L5-S1 transforaminal epidural steroid injection done on 11/17/2023 that gave him 60+ percent relief.  This is affecting his ambulatory status but this is affecting his quality of life. It should be noted that he has used prednisone, ibuprofen, and done physical therapy in the past without relief.  The most improvement he has gotten has been from the injection.  He wonders what doing this again.        Review of Systems   Constitutional: Negative.    HENT: Negative.     Eyes: Negative.    Respiratory: Negative.     Cardiovascular: Negative.    Gastrointestinal: Negative.    Endocrine: Negative.    Genitourinary: Negative.    Musculoskeletal:  Positive for arthralgias, back pain, gait problem and myalgias.   Skin: Negative.    Allergic/Immunologic: Negative.    Neurological:  Positive for weakness and numbness.   Hematological: Negative.    Psychiatric/Behavioral:  Negative.         Objective   Physical Exam  Vitals and nursing note reviewed.   Constitutional:       Appearance: Normal appearance. He is normal weight.   HENT:      Head: Normocephalic and atraumatic.      Right Ear: External ear normal.      Left Ear: External ear normal.      Nose: Nose normal.      Mouth/Throat:      Pharynx: Oropharynx is clear.   Eyes:      Conjunctiva/sclera: Conjunctivae normal.   Cardiovascular:      Rate and Rhythm: Normal rate and regular rhythm.      Pulses: Normal pulses.   Musculoskeletal:         General: Normal range of motion.      Cervical back: Normal range of motion.   Skin:     General: Skin is warm and dry.      Comments: 5/5 upper and lower extremity strength other than left shoulder which has diminished range of motion due to pain.  Pain with internal and external rotation      Neurological:      General: No focal deficit present.      Mental Status: He is alert and oriented to person, place, and time.   Psychiatric:         Mood and Affect: Mood normal.         Behavior: Behavior normal.         Thought Content: Thought content normal.         Judgment: Judgment normal.         XR LUMBAR SPINE COMPLETE 4+ VIEWS  Status: Final result     Signed by    Signed Time Phone Pager   David Yuen MD 5/11/2020 10:47 [phone]     Exam Information    Status Exam Begun Exam Ended   Final 5/11/2020 10:25 5/11/2020 10:35     Study Result    Narrative & Impression   MRN: [id number]  Patient Name: HIRAM SHEPPARD     STUDY:  SPINE, LUMBOSACRAL  MIN 4 VIEWS;; 5/11/2020 10:35 am     INDICATION:  pain.     COMPARISON:  03/25/2019     ACCESSION NUMBER(S):  [id number]     ORDERING CLINICIAN:  LAUREN CORRIGAN     FINDINGS:  5 lumbar vertebrae are present. No compression fracture. The  alignment is maintained. Slight narrowing of the L4-L5 intervertebral  disc is present, with osteophyte formation anteriorly. The pedicles  and posterior elements are intact. Mild degenerative changes  are  present at the L5-S1 facet joints.  Calcification of the abdominal aorta is present.     IMPRESSION:  Mild degenerative disc disease at L4-5. No acute pathology. No  significant change from 03/25/2019.     Assessment/Plan   Diagnoses and all orders for this visit:  Lumbosacral radiculopathy  -     Transforaminal; Future  Osteoarthritis of spine with radiculopathy, lumbosacral region  Arthropathy of left shoulder  Insomnia, unspecified type  -     amitriptyline (Elavil) 50 mg tablet; Take 1 tablet (50 mg) by mouth once daily at bedtime.       Patient is a 59-year-old male.  He has a past medical history significant for lumbosacral neuritis, left shoulder pain and left shoulder arthritis.  Recent repeat left shoulder injection done on 2/8/2024 gave him significant relief-more than 50% for 6 weeks but now he has 50% relief and it is starting to wear off.  He would like to have a repeat injection in early May when he can as this works well for him.  In regards to his lower back and radicular symptoms patient also underwent bilateral L5-S1 transforaminal epidural steroid injection done on 11/17/2023 that gave him 60+ percent relief.  This affects his ambulatory status.  This affects his quality of life.  This affects his activities.  Unfortunately, this pain has also started to return.  He rates his discomfort an 8/10.  Since he did well with the previous injection we discussed once again repeating the bilateral L5-S1 transforaminal epidural steroid injection under fluoroscopy for both diagnostic and therapeutic purposes.  Procedure was discussed.  Risk and benefits were discussed.  Medication hold including Advil for 4 days was discussed.  Diagnosis-M54.17-lumbosacral neuritis.  Patient will follow-up 2 weeks after the injection for reevaluation.  Will plan to repeat the shoulder injection at that time.  In the meantime we will also increase his amitriptyline to 50 mg at bedtime.

## 2024-04-17 ENCOUNTER — TELEPHONE (OUTPATIENT)
Dept: PAIN MEDICINE | Facility: CLINIC | Age: 59
End: 2024-04-17
Payer: MEDICARE

## 2024-04-17 NOTE — TELEPHONE ENCOUNTER
BILATERAL L5-S1 TFESI CPT 41259 DX M54.17  NO PRIOR AUTH REQUIRED FOR BOTH  (ALSO CHECKED LEFT SHOULDER INJECTION)  REF #[id number]

## 2024-05-23 DIAGNOSIS — G47.00 INSOMNIA, UNSPECIFIED TYPE: ICD-10-CM

## 2024-05-23 RX ORDER — AMITRIPTYLINE HYDROCHLORIDE 50 MG/1
50 TABLET, FILM COATED ORAL NIGHTLY
Qty: 30 TABLET | Refills: 1 | OUTPATIENT
Start: 2024-05-23

## 2024-05-30 ENCOUNTER — APPOINTMENT (OUTPATIENT)
Dept: PAIN MEDICINE | Facility: CLINIC | Age: 59
End: 2024-05-30
Payer: MEDICARE

## 2024-06-07 ENCOUNTER — HOSPITAL ENCOUNTER (OUTPATIENT)
Dept: OPERATING ROOM | Facility: HOSPITAL | Age: 59
Setting detail: OUTPATIENT SURGERY
Discharge: HOME | End: 2024-06-07
Payer: MEDICARE

## 2024-06-07 ENCOUNTER — HOSPITAL ENCOUNTER (OUTPATIENT)
Dept: RADIOLOGY | Facility: HOSPITAL | Age: 59
Setting detail: OUTPATIENT SURGERY
Discharge: HOME | End: 2024-06-07
Payer: MEDICARE

## 2024-06-07 VITALS
DIASTOLIC BLOOD PRESSURE: 77 MMHG | OXYGEN SATURATION: 99 % | WEIGHT: 190 LBS | HEIGHT: 71 IN | RESPIRATION RATE: 16 BRPM | SYSTOLIC BLOOD PRESSURE: 138 MMHG | TEMPERATURE: 97.3 F | BODY MASS INDEX: 26.6 KG/M2 | HEART RATE: 65 BPM

## 2024-06-07 DIAGNOSIS — M54.17 LUMBOSACRAL RADICULOPATHY: ICD-10-CM

## 2024-06-07 PROCEDURE — 7100000010 HC PHASE TWO TIME - EACH INCREMENTAL 1 MINUTE

## 2024-06-07 PROCEDURE — 7100000009 HC PHASE TWO TIME - INITIAL BASE CHARGE

## 2024-06-07 PROCEDURE — 2500000005 HC RX 250 GENERAL PHARMACY W/O HCPCS: Performed by: PHYSICIAN ASSISTANT

## 2024-06-07 PROCEDURE — 2550000001 HC RX 255 CONTRASTS: Performed by: PHYSICIAN ASSISTANT

## 2024-06-07 PROCEDURE — 2500000004 HC RX 250 GENERAL PHARMACY W/ HCPCS (ALT 636 FOR OP/ED): Performed by: PHYSICIAN ASSISTANT

## 2024-06-07 PROCEDURE — 64483 NJX AA&/STRD TFRM EPI L/S 1: CPT | Mod: 50 | Performed by: STUDENT IN AN ORGANIZED HEALTH CARE EDUCATION/TRAINING PROGRAM

## 2024-06-07 PROCEDURE — 2500000005 HC RX 250 GENERAL PHARMACY W/O HCPCS: Performed by: STUDENT IN AN ORGANIZED HEALTH CARE EDUCATION/TRAINING PROGRAM

## 2024-06-07 RX ORDER — DEXAMETHASONE SODIUM PHOSPHATE 10 MG/ML
10 INJECTION INTRAMUSCULAR; INTRAVENOUS ONCE
Status: COMPLETED | OUTPATIENT
Start: 2024-06-07 | End: 2024-06-07

## 2024-06-07 RX ORDER — BUPIVACAINE HYDROCHLORIDE 2.5 MG/ML
2 INJECTION, SOLUTION EPIDURAL; INFILTRATION; INTRACAUDAL ONCE
Status: DISCONTINUED | OUTPATIENT
Start: 2024-06-07 | End: 2024-06-08 | Stop reason: HOSPADM

## 2024-06-07 RX ORDER — LIDOCAINE HYDROCHLORIDE 5 MG/ML
INJECTION, SOLUTION INFILTRATION; PERINEURAL AS NEEDED
Status: COMPLETED | OUTPATIENT
Start: 2024-06-07 | End: 2024-06-07

## 2024-06-07 RX ORDER — LIDOCAINE HYDROCHLORIDE 10 MG/ML
10 INJECTION, SOLUTION EPIDURAL; INFILTRATION; INTRACAUDAL; PERINEURAL ONCE
Status: COMPLETED | OUTPATIENT
Start: 2024-06-07 | End: 2024-06-07

## 2024-06-07 RX ADMIN — IOHEXOL 6 ML: 300 INJECTION, SOLUTION INTRAVENOUS at 12:07

## 2024-06-07 RX ADMIN — LIDOCAINE HYDROCHLORIDE 2 ML: 5 INJECTION, SOLUTION INFILTRATION; PERINEURAL at 12:08

## 2024-06-07 RX ADMIN — LIDOCAINE HYDROCHLORIDE 6 ML: 10 INJECTION, SOLUTION EPIDURAL; INFILTRATION; INTRACAUDAL; PERINEURAL at 12:07

## 2024-06-07 RX ADMIN — DEXAMETHASONE SODIUM PHOSPHATE 10 MG: 10 INJECTION, SOLUTION INTRAMUSCULAR; INTRAVENOUS at 12:08

## 2024-06-07 ASSESSMENT — PAIN - FUNCTIONAL ASSESSMENT
PAIN_FUNCTIONAL_ASSESSMENT: 0-10
PAIN_FUNCTIONAL_ASSESSMENT: 0-10

## 2024-06-07 ASSESSMENT — COLUMBIA-SUICIDE SEVERITY RATING SCALE - C-SSRS
1. IN THE PAST MONTH, HAVE YOU WISHED YOU WERE DEAD OR WISHED YOU COULD GO TO SLEEP AND NOT WAKE UP?: NO
2. HAVE YOU ACTUALLY HAD ANY THOUGHTS OF KILLING YOURSELF?: NO
6. HAVE YOU EVER DONE ANYTHING, STARTED TO DO ANYTHING, OR PREPARED TO DO ANYTHING TO END YOUR LIFE?: NO

## 2024-06-07 ASSESSMENT — PAIN SCALES - GENERAL
PAINLEVEL_OUTOF10: 0 - NO PAIN
PAINLEVEL_OUTOF10: 7

## 2024-06-07 NOTE — OP NOTE
* No procedures listed * Operative Note     Date: 2024  OR Location: [place] [address] OR    Name: Luis Felipe Moreno, : 1965, Age: 59 y.o., MRN: [id number], Sex: male    Diagnosis  * No Diagnosis Codes entered * * No Diagnosis Codes entered *     Procedures  * No procedures documented on diagnosis form *    Surgeons   * No surgeons found in log *    Resident/Fellow/Other Assistant:  * No surgeons found in log *    Procedure Summary  Anesthesia: * No anesthesia type entered *  ASA: ASA status not filed in the log.  Anesthesia Staff: No anesthesia staff entered.  Estimated Blood Loss:  0 mL  Intra-op Medications: * Intraprocedure medication information is unavailable because the case start and end events have not been set *      Intraprocedure I/O Totals       None           Specimen: No specimens collected     Staff:   Circulator: Deidra  Circulator: Stacie  Circulator: Iris Kelley Person: Adiel         Drains and/or Catheters: * None in log *    Tourniquet Times:         Implants:     Findings:      Indications: Luis Felipe Moreno is an 59 y.o. male who is having surgery for * No pre-op diagnosis entered *.      The patient was seen in the preoperative area. The risks, benefits, complications, treatment options, non-operative alternatives, expected recovery and outcomes were discussed with the patient. The possibilities of reaction to medication, pulmonary aspiration, injury to surrounding structures, bleeding, recurrent infection, the need for additional procedures, failure to diagnose a condition, and creating a complication requiring transfusion or operation were discussed with the patient. The patient concurred with the proposed plan, giving informed consent.  The site of surgery was properly noted/marked if necessary per policy. The patient has been actively warmed in preoperative area. Preoperative antibiotics are not indicated. Venous thrombosis prophylaxis are not indicated.    Procedure Details: Diagnosis:  M54.17, lumbosacral radiculopathy    Procedure: Bilateral L5/S1 transforaminal epidural steroid injections under fluoroscopic guidance    Anesthesia: Local    Complications: None    After informed consent was obtained, the patient was brought to the procedure suite and placed in the prone position.  Pulse oximetry and blood pressure were monitored throughout.  The low back area was prepped and draped in the usual sterile fashion.  Using fluoroscopic guidance, the skin and subcutaneous tissue overlying the needle trajectory of the neuroforamina were anesthetized with 2.0% lidocaine.  The 22-gauge Radha needles were then advanced under fluoroscopic guidance into the foramina.  Needle tip positions were confirmed in at least two views.  Injection of Omnipaque contrast revealed appropriate spread of the dye without vascular uptake.  Next, at each site, 1.5 mL of 0.5% lidocaine and 5 mg dexamethasone were injected in divided doses through each needle tip.  The needles were removed and the patient was then transferred to the recovery room in stable condition.  The patient tolerated the procedure well.  There were no apparent complications.     FOLLOW UP:  The patient will update us on their response to this procedure, and agrees to continue currently prescribed/recommended therapies.    Complications:  None; patient tolerated the procedure well.    Disposition:  home  Condition: stable         Additional Details:      Attending Attestation:     *No primary surgeon found*

## 2024-06-07 NOTE — H&P
History Of Present Illness  Luis Felipe Moreno is a 59 y.o. male presenting with pain.     Past Medical History  Past Medical History:   Diagnosis Date    Complete tear of left rotator cuff 07/27/2017    Contusion, laceration, and hemorrhage of brainstem with loss of consciousness status unknown, initial encounter (Multi)     Brain stem injury    S/P arthroscopy of shoulder 07/27/2017    Sprain of left shoulder 06/09/2017    Syringomyelia and syringobulbia (Multi) 06/23/2017    Syringomyelia and syringobulbia    Ulnar neuropathy of both upper extremities 06/02/2017    Unspecified injury of shoulder and upper arm, unspecified arm, initial encounter     Shoulder injury       Surgical History  Past Surgical History:   Procedure Laterality Date    CARPAL TUNNEL RELEASE  02/01/2017    Neuroplasty Median Nerve At Carpal Tunnel    IR INJECTION EPIDURAL STEROID Bilateral 11/17/2023    Bilat L5/S1 TFESI    KNEE ARTHROSCOPY W/ DEBRIDEMENT  02/01/2017    Arthroscopy Knee    OTHER SURGICAL HISTORY  02/01/2017    Nerve Block Transforaminal Epidural Lumbar L5 - S1    OTHER SURGICAL HISTORY  02/01/2017    History Of Prior Surgery    OTHER SURGICAL HISTORY  10/14/2022    Epidural steroid injection    OTHER SURGICAL HISTORY  10/14/2022    Epidural steroid injection    OTHER SURGICAL HISTORY  09/04/2020    Epidural steroid injection    OTHER SURGICAL HISTORY  01/22/2021    Epidural steroid injection    OTHER SURGICAL HISTORY  01/21/2022    Epidural steroid injection    OTHER SURGICAL HISTORY  05/22/2020    Epidural steroid injection    SHOULDER SURGERY  09/13/2017    Shoulder Surgery        Social History  He reports that he has been smoking cigarettes. He has a 35 pack-year smoking history. He has been exposed to tobacco smoke. He has never used smokeless tobacco. He reports current alcohol use of about 6.0 standard drinks of alcohol per week. He reports that he does not use drugs.    Family History  Family History   Problem Relation  "Name Age of Onset    Alcohol abuse Mother      Lung cancer Father      Cancer Father          urinary bladder        Allergies  Patient has no known allergies.    Review of Systems   All other systems reviewed and are negative.       Physical Exam     Last Recorded Vitals  Blood pressure 137/79, pulse 65, temperature 36.6 °C (97.9 °F), temperature source Temporal, resp. rate 16, height 1.803 m (5' 11\"), weight 86.2 kg (190 lb), SpO2 98%.    Relevant Results        Constitutional: No acute distress, well appearing and well nourished. Patient appears stated age.  Eyes:  nonicteric sclerae   ENT: Hearing is grossly intact.  Neck: trachea midline  Head and Face: grossly normal.  Respiratory: nonlabored breathing  Cardiovascular: rate per vitals.  Neuro: alert, moving extremities.       Assessment/Plan   Active Problems:  There are no active Hospital Problems.      Bilateral l5/s1 ltr       I spent   minutes in the professional and overall care of this patient.      Emanuel Matthews, DO    "

## 2024-06-27 ENCOUNTER — OFFICE VISIT (OUTPATIENT)
Dept: PAIN MEDICINE | Facility: CLINIC | Age: 59
End: 2024-06-27
Payer: MEDICARE

## 2024-06-27 VITALS
BODY MASS INDEX: 25.9 KG/M2 | HEART RATE: 72 BPM | WEIGHT: 185 LBS | HEIGHT: 71 IN | SYSTOLIC BLOOD PRESSURE: 131 MMHG | DIASTOLIC BLOOD PRESSURE: 72 MMHG | RESPIRATION RATE: 20 BRPM

## 2024-06-27 DIAGNOSIS — M19.012 ARTHROPATHY OF LEFT SHOULDER: ICD-10-CM

## 2024-06-27 DIAGNOSIS — M70.61 TROCHANTERIC BURSITIS OF BOTH HIPS: ICD-10-CM

## 2024-06-27 DIAGNOSIS — M54.17 LUMBOSACRAL RADICULOPATHY: ICD-10-CM

## 2024-06-27 DIAGNOSIS — G62.9 NEUROPATHY: ICD-10-CM

## 2024-06-27 DIAGNOSIS — M70.62 TROCHANTERIC BURSITIS OF BOTH HIPS: ICD-10-CM

## 2024-06-27 DIAGNOSIS — M19.012 ARTHRITIS OF LEFT SHOULDER REGION: Primary | ICD-10-CM

## 2024-06-27 DIAGNOSIS — M51.17 INTERVERTEBRAL DISC DISORDER WITH RADICULOPATHY OF LUMBOSACRAL REGION: ICD-10-CM

## 2024-06-27 PROCEDURE — 2500000004 HC RX 250 GENERAL PHARMACY W/ HCPCS (ALT 636 FOR OP/ED): Performed by: PHYSICIAN ASSISTANT

## 2024-06-27 PROCEDURE — 20610 DRAIN/INJ JOINT/BURSA W/O US: CPT | Performed by: PHYSICIAN ASSISTANT

## 2024-06-27 RX ORDER — METHYLPREDNISOLONE ACETATE 40 MG/ML
40 INJECTION, SUSPENSION INTRA-ARTICULAR; INTRALESIONAL; INTRAMUSCULAR; SOFT TISSUE ONCE
Status: COMPLETED | OUTPATIENT
Start: 2024-06-27 | End: 2024-06-27

## 2024-06-27 RX ORDER — BUPIVACAINE HYDROCHLORIDE 5 MG/ML
3 INJECTION, SOLUTION EPIDURAL; INTRACAUDAL ONCE
Status: COMPLETED | OUTPATIENT
Start: 2024-06-27 | End: 2024-06-27

## 2024-06-27 ASSESSMENT — ENCOUNTER SYMPTOMS
WEAKNESS: 1
MYALGIAS: 1
RESPIRATORY NEGATIVE: 1
ARTHRALGIAS: 1
GASTROINTESTINAL NEGATIVE: 1
CARDIOVASCULAR NEGATIVE: 1
HEMATOLOGIC/LYMPHATIC NEGATIVE: 1
BACK PAIN: 1
CONSTITUTIONAL NEGATIVE: 1
ENDOCRINE NEGATIVE: 1
PSYCHIATRIC NEGATIVE: 1
ALLERGIC/IMMUNOLOGIC NEGATIVE: 1
NUMBNESS: 1
EYES NEGATIVE: 1

## 2024-06-27 ASSESSMENT — PAIN SCALES - GENERAL
PAINLEVEL: 8
PAINLEVEL_OUTOF10: 7

## 2024-06-27 NOTE — PROGRESS NOTES
"Subjective   Patient ID: Luis Felipe Moreon is a 59 y.o. male who presents for back Pain (Patient complains of bilateral hip pain.  He states this pain is a constant 5-7/10. It is in the lateral hips worst when he wakes up. He states sitting too much makes this pain worse.  Moving around helps it to \"loosen up\".  ), Shoulder Pain (Patient complains of left shoulder pain.  He rates this pain a 8/10.  He wants an injection today and Back Pain (Patient following up from Bilat L5/S1 TFESI that he had with Dr. Matthews on 06/07/24.  Patient reports he got 80% relief from this injection.  Patient denied pain in his lower back at this time.).    AMIRA score 58.  SOAPP 10.    Maria Eugenia Hassan RN 06/27/24 8:25 AM     Patient is a 59-year-old male following up today after undergoing bilateral L5-S1 transforaminal epidural steroid injection.  This was done on 6/7/2024 and has given him 80% relief.  His radicular symptoms are better.  He does note some lateral hip pain worse with laying on the area.  It is worse when he first gets up and if he moves around it does sometimes get a little bit better.  He also has left shoulder pain.  He wants another left shoulder injection.  He had 1 back in February and this worked very well for him.  Well over 50% for 3 months.  He states that he has been contemplating having a left shoulder surgery but he is close to pain and he really does not want to have to do this at this time if he does not have to.  He would like to get another injection as these have worked well for him in the past.  At this time, he has difficulty with overhead activity and difficulty doing certain things because of the left shoulder pain.  He rates an 8/10.  He wants to get this taken care of.  In regards to his lateral hip pain he has not yet done anything for this.        Review of Systems   Constitutional: Negative.    HENT: Negative.     Eyes: Negative.    Respiratory: Negative.     Cardiovascular: Negative.  "   Gastrointestinal: Negative.    Endocrine: Negative.    Genitourinary: Negative.    Musculoskeletal:  Positive for arthralgias, back pain, gait problem and myalgias.   Skin: Negative.    Allergic/Immunologic: Negative.    Neurological:  Positive for weakness and numbness.   Hematological: Negative.    Psychiatric/Behavioral: Negative.         Objective   Physical Exam  Vitals and nursing note reviewed.   Constitutional:       Appearance: Normal appearance.   HENT:      Head: Normocephalic and atraumatic.      Right Ear: External ear normal.      Left Ear: External ear normal.      Nose: Nose normal.      Mouth/Throat:      Pharynx: Oropharynx is clear.   Eyes:      Conjunctiva/sclera: Conjunctivae normal.   Cardiovascular:      Rate and Rhythm: Normal rate and regular rhythm.      Pulses: Normal pulses.   Pulmonary:      Effort: Pulmonary effort is normal.   Musculoskeletal:         General: Normal range of motion.      Cervical back: Normal range of motion.      Comments: 5/5 upper and lower extremity strength other than left shoulder where he has left deltoid 4+ to 5 -/5 due to pain  Pain with internal and external Tatian  5/5 lower extremity strength  Pain with rotation of the lateral hips suspicious for trochanteric bursitis.   Skin:     General: Skin is warm and dry.   Neurological:      General: No focal deficit present.      Mental Status: He is alert and oriented to person, place, and time. Mental status is at baseline.   Psychiatric:         Mood and Affect: Mood normal.         Behavior: Behavior normal.         Thought Content: Thought content normal.         Judgment: Judgment normal.         Assessment/Plan   Diagnoses and all orders for this visit:  Arthritis of left shoulder region  -     Joint aspiration, shoulder; Future  -     bupivacaine PF (Marcaine) 0.5 % (5 mg/mL) injection 15 mg  -     methylPREDNISolone acetate (DEPO-Medrol) injection 40 mg  -     Joint Injection/Aspiration  Arthropathy of  left shoulder  Neuropathy  Lumbosacral radiculopathy  Intervertebral disc disorder with radiculopathy of lumbosacral region  Trochanteric bursitis of both hips       Patient is a 59-year-old male following up today with the above-mentioned medical diagnoses.  He underwent recent bilateral L5-S1 transforaminal epidural steroid injection.  This was done on 6/7/2024 and has given him 80% relief.  He is having a lot of left shoulder pain today.  He rates an 8/10.  He had shoulder injection in the past that worked well for him.  The last 1 was well over 3 months ago.  Will repeat this today.  Please refer to separate portion of this report.  Patient tolerated the procedure well.  In regards to his lateral hip pain it is suspicious for trochanteric bursitis.  He has not yet trialed anything for this.  We discussed over-the-counter options including ice, Voltaren, and lidocaine.  We also discussed some stretching exercises.  He is going to pursue these things.  He will follow-up in 1 month.  Depending on how he is doing we can further consider trochanteric bursa injections.

## 2024-06-27 NOTE — PROGRESS NOTES
Patient ID: Luis Felipe Moreno is a 59 y.o. male.    Joint Injection/Aspiration    Date/Time: 6/27/2024 8:43 AM    Performed by: Catherine Harrell PA-C  Authorized by: Catherine Harrell PA-C    Consent:     Consent obtained:  Written    Consent given by:  Patient    Risks, benefits, and alternatives were discussed: yes      Risks discussed:  Bleeding, infection and pain    Alternatives discussed:  No treatment, delayed treatment and alternative treatment  Universal protocol:     Patient identity confirmed:  Verbally with patient  Location:     Location:  Shoulder    Shoulder:  L glenohumeral  Anesthesia:     Anesthesia method:  None  Procedure details:     Needle gauge:  20 G    Approach:  Posterior    Steroid injected: yes      Specimen collected: no    Post-procedure details:     Dressing:  Adhesive bandage    Procedure completion:  Tolerated well, no immediate complications  Comments:      3 mL of 0.5% bupivacaine and 40 mg of triamcinolone was injected into the left shoulder without problem.

## 2024-07-18 ENCOUNTER — OFFICE VISIT (OUTPATIENT)
Dept: PAIN MEDICINE | Facility: CLINIC | Age: 59
End: 2024-07-18
Payer: MEDICARE

## 2024-07-18 ENCOUNTER — HOSPITAL ENCOUNTER (OUTPATIENT)
Dept: RADIOLOGY | Facility: HOSPITAL | Age: 59
Discharge: HOME | End: 2024-07-18
Payer: MEDICARE

## 2024-07-18 VITALS — HEART RATE: 66 BPM | DIASTOLIC BLOOD PRESSURE: 74 MMHG | SYSTOLIC BLOOD PRESSURE: 132 MMHG

## 2024-07-18 DIAGNOSIS — M54.16 LUMBAR RADICULOPATHY: ICD-10-CM

## 2024-07-18 DIAGNOSIS — M51.17 INTERVERTEBRAL DISC DISORDER WITH RADICULOPATHY OF LUMBOSACRAL REGION: ICD-10-CM

## 2024-07-18 DIAGNOSIS — G62.9 NEUROPATHY: ICD-10-CM

## 2024-07-18 DIAGNOSIS — M70.62 TROCHANTERIC BURSITIS OF BOTH HIPS: ICD-10-CM

## 2024-07-18 DIAGNOSIS — M70.61 TROCHANTERIC BURSITIS OF BOTH HIPS: ICD-10-CM

## 2024-07-18 DIAGNOSIS — M54.17 LUMBOSACRAL RADICULOPATHY: ICD-10-CM

## 2024-07-18 DIAGNOSIS — M54.17 LUMBOSACRAL RADICULOPATHY: Primary | ICD-10-CM

## 2024-07-18 PROCEDURE — 99214 OFFICE O/P EST MOD 30 MIN: CPT | Performed by: PHYSICIAN ASSISTANT

## 2024-07-18 PROCEDURE — 72170 X-RAY EXAM OF PELVIS: CPT | Performed by: RADIOLOGY

## 2024-07-18 PROCEDURE — 72170 X-RAY EXAM OF PELVIS: CPT

## 2024-07-18 RX ORDER — PREGABALIN 50 MG/1
50 CAPSULE ORAL 2 TIMES DAILY
Qty: 60 CAPSULE | Refills: 1 | Status: SHIPPED | OUTPATIENT
Start: 2024-07-18

## 2024-07-18 ASSESSMENT — ENCOUNTER SYMPTOMS
RESPIRATORY NEGATIVE: 1
GASTROINTESTINAL NEGATIVE: 1
WEAKNESS: 1
NUMBNESS: 1
ALLERGIC/IMMUNOLOGIC NEGATIVE: 1
PSYCHIATRIC NEGATIVE: 1
EYES NEGATIVE: 1
HEMATOLOGIC/LYMPHATIC NEGATIVE: 1
MYALGIAS: 1
BACK PAIN: 1
ARTHRALGIAS: 1
CARDIOVASCULAR NEGATIVE: 1
CONSTITUTIONAL NEGATIVE: 1
ENDOCRINE NEGATIVE: 1

## 2024-07-18 ASSESSMENT — PAIN SCALES - GENERAL: PAINLEVEL: 10-WORST PAIN EVER

## 2024-07-18 NOTE — PROGRESS NOTES
"Subjective   Patient ID: Luis Felipe Moreno is a 59 y.o. male who presents for Shoulder Pain (Patient had a left shoulder injection last visit.  He reports 80% relief.  ) and Hip Pain (Patient's major complaint in right hip pain with pain radiating down his right leg to his foot.  He states his right calf feels like it's \"on fire\".  Patient states it feels like there's needles coming out of his leg\".  Patient states this pain started about a month ago.  Patient rates his pain a 10/10.).    AMIRA score 58.    Maria Eugenia Hassan RN 07/18/24 10:54 AM     Patient is a 59-year-old male following up today after undergoing A left shoulder injection.  This was done at his last appointment.  He has given him 80% relief.  He states that things are better.,  About a week before that he started to note right radiating leg pain which she did not discuss with me and he states that this has just been increasingly getting worse.  She rates her discomfort a 10/10.  He states that his leg feels a ball.  It goes from his lateral hip to his ankle.  He feels like his calf is on fire.  He feels like there is needles coming out of his leg and he states that it is just unbearable. He cannot stand.  He cannot get comfortable.  Historically he underwent bilateral L5-S1 transforaminal epidural steroid injection.  This was done on 6/7/2024 and has given him 80% relief.  Unfortunate, his radicular symptoms very quickly returned and at this time have just been getting more and more intense.  He states that is just unbearable and he wonders what he can do to try to get some pain relief.  He wonders if there is something else going on.  He cannot get comfortable.  He states that he is just miserable.    A left shoulder injection.  This was done at his last appointment.  He has given him 80% relief.  He states that things are better.,  About a week before that he started to note right radiating leg pain which she did not discuss with me and he states that " this has just been increasingly getting worse.  She rates her discomfort a 10/10.  He states that his leg feels a ball.  It goes from his lateral hip to his ankle.  He feels like his calf is on fire.  He feels like there is needles coming out of his leg and he states that it is just unbearable.    Review of Systems   Constitutional: Negative.    HENT: Negative.     Eyes: Negative.    Respiratory: Negative.     Cardiovascular: Negative.    Gastrointestinal: Negative.    Endocrine: Negative.    Genitourinary: Negative.    Musculoskeletal:  Positive for arthralgias, back pain, gait problem and myalgias.   Skin: Negative.    Allergic/Immunologic: Negative.    Neurological:  Positive for weakness and numbness.   Hematological: Negative.    Psychiatric/Behavioral: Negative.         Objective   Physical Exam  Vitals and nursing note reviewed.   Constitutional:       Appearance: Normal appearance.   HENT:      Head: Normocephalic and atraumatic.      Right Ear: External ear normal.      Left Ear: External ear normal.      Nose: Nose normal.      Mouth/Throat:      Pharynx: Oropharynx is clear.   Eyes:      Conjunctiva/sclera: Conjunctivae normal.   Cardiovascular:      Rate and Rhythm: Normal rate and regular rhythm.      Pulses: Normal pulses.   Pulmonary:      Effort: Pulmonary effort is normal.   Musculoskeletal:         General: Normal range of motion.      Cervical back: Normal range of motion.      Comments: Unable to get comfortable.  Unable to sit.  Pacing around.  Grabbing his right leg.  Right hip flexion, ADF and EHL 4/5  Positive right straight leg raise and pain with movement of the leg at all  Some pain with internal and external rotation.   Skin:     General: Skin is warm and dry.   Neurological:      General: No focal deficit present.      Mental Status: He is alert and oriented to person, place, and time. Mental status is at baseline.   Psychiatric:         Mood and Affect: Mood normal.         Behavior:  Behavior normal.         Thought Content: Thought content normal.         Judgment: Judgment normal.         Assessment/Plan   Diagnoses and all orders for this visit:  Lumbosacral radiculopathy  -     MR lumbar spine wo IV contrast; Future  -     XR pelvis 1-2 views; Future  -     pregabalin (Lyrica) 50 mg capsule; Take 1 capsule (50 mg) by mouth 2 times a day.  Intervertebral disc disorder with radiculopathy of lumbosacral region  -     MR lumbar spine wo IV contrast; Future  -     XR pelvis 1-2 views; Future  -     pregabalin (Lyrica) 50 mg capsule; Take 1 capsule (50 mg) by mouth 2 times a day.  Neuropathy  -     MR lumbar spine wo IV contrast; Future  -     XR pelvis 1-2 views; Future  -     pregabalin (Lyrica) 50 mg capsule; Take 1 capsule (50 mg) by mouth 2 times a day.  Lumbar radiculopathy  -     MR lumbar spine wo IV contrast; Future  -     XR pelvis 1-2 views; Future  Trochanteric bursitis of both hips       Patient is a 59-year-old male with a past medical history significant for medical diagnoses following up today to discuss his options.  He states that at this time, he is having right radiating leg pain that is just unbearable.  This is affecting his ability to get hold.  This is effecting his quality of life.  Affecting his activities.  Recent shoulder injection helped and previous transforaminal epidural steroid injection helped but unfortunate, the right radiating leg pain started not long after the transforaminal epidural and he states that it has just been getting more and more intense.  He also notes some intermittent groin pain.  Based on his intense pain, his inability to get comfortable.  The groin pain and his failure to improve with a multitude of over-the-counter medications he has trialed I recommended to patient a pelvis x-ray as well as a lumbar MRI for possible other injection options versus surgical consultation depending on the results.  Patient is agreeable.  Patient be noted that  previous transforaminal epidural steroid injection helped it was short-lived and I would like to assess for any other neural compression or increase of neural compression.  In the meantime.  To try to help with some of the pain we will start Lyrica.  50 mg twice daily.  OARRS reviewed.  Prescription sent.

## 2024-07-22 ENCOUNTER — TELEPHONE (OUTPATIENT)
Dept: PAIN MEDICINE | Facility: CLINIC | Age: 59
End: 2024-07-22

## 2024-07-25 ENCOUNTER — APPOINTMENT (OUTPATIENT)
Dept: PAIN MEDICINE | Facility: CLINIC | Age: 59
End: 2024-07-25
Payer: MEDICARE

## 2024-07-30 ENCOUNTER — APPOINTMENT (OUTPATIENT)
Dept: PAIN MEDICINE | Facility: CLINIC | Age: 59
End: 2024-07-30
Payer: MEDICARE

## 2024-08-05 ENCOUNTER — HOSPITAL ENCOUNTER (OUTPATIENT)
Dept: RADIOLOGY | Facility: HOSPITAL | Age: 59
Discharge: HOME | End: 2024-08-05
Payer: MEDICARE

## 2024-08-05 ENCOUNTER — APPOINTMENT (OUTPATIENT)
Dept: PRIMARY CARE | Facility: CLINIC | Age: 59
End: 2024-08-05
Payer: MEDICARE

## 2024-08-05 VITALS
HEART RATE: 70 BPM | SYSTOLIC BLOOD PRESSURE: 130 MMHG | DIASTOLIC BLOOD PRESSURE: 82 MMHG | HEIGHT: 71 IN | BODY MASS INDEX: 26.04 KG/M2 | WEIGHT: 186 LBS

## 2024-08-05 DIAGNOSIS — M51.17 INTERVERTEBRAL DISC DISORDER WITH RADICULOPATHY OF LUMBOSACRAL REGION: ICD-10-CM

## 2024-08-05 DIAGNOSIS — M54.17 LUMBOSACRAL RADICULOPATHY: ICD-10-CM

## 2024-08-05 DIAGNOSIS — M54.16 LUMBAR RADICULOPATHY: ICD-10-CM

## 2024-08-05 DIAGNOSIS — E78.00 HYPERCHOLESTEROLEMIA: ICD-10-CM

## 2024-08-05 DIAGNOSIS — N40.1 BENIGN PROSTATIC HYPERPLASIA WITH LOWER URINARY TRACT SYMPTOMS, SYMPTOM DETAILS UNSPECIFIED: ICD-10-CM

## 2024-08-05 DIAGNOSIS — I10 PRIMARY HYPERTENSION: Primary | ICD-10-CM

## 2024-08-05 DIAGNOSIS — J44.9 CHRONIC OBSTRUCTIVE PULMONARY DISEASE, UNSPECIFIED COPD TYPE (MULTI): ICD-10-CM

## 2024-08-05 DIAGNOSIS — Z12.5 SPECIAL SCREENING FOR MALIGNANT NEOPLASM OF PROSTATE: ICD-10-CM

## 2024-08-05 DIAGNOSIS — G62.9 NEUROPATHY: ICD-10-CM

## 2024-08-05 PROCEDURE — 3079F DIAST BP 80-89 MM HG: CPT | Performed by: INTERNAL MEDICINE

## 2024-08-05 PROCEDURE — 72148 MRI LUMBAR SPINE W/O DYE: CPT | Performed by: RADIOLOGY

## 2024-08-05 PROCEDURE — 3008F BODY MASS INDEX DOCD: CPT | Performed by: INTERNAL MEDICINE

## 2024-08-05 PROCEDURE — 4004F PT TOBACCO SCREEN RCVD TLK: CPT | Performed by: INTERNAL MEDICINE

## 2024-08-05 PROCEDURE — 3075F SYST BP GE 130 - 139MM HG: CPT | Performed by: INTERNAL MEDICINE

## 2024-08-05 PROCEDURE — 72148 MRI LUMBAR SPINE W/O DYE: CPT

## 2024-08-05 PROCEDURE — 99214 OFFICE O/P EST MOD 30 MIN: CPT | Performed by: INTERNAL MEDICINE

## 2024-08-05 RX ORDER — ATORVASTATIN CALCIUM 10 MG/1
10 TABLET, FILM COATED ORAL DAILY
Qty: 90 TABLET | Refills: 3 | Status: SHIPPED | OUTPATIENT
Start: 2024-08-05

## 2024-08-05 RX ORDER — TAMSULOSIN HYDROCHLORIDE 0.4 MG/1
0.4 CAPSULE ORAL DAILY
Qty: 90 CAPSULE | Refills: 3 | Status: SHIPPED | OUTPATIENT
Start: 2024-08-05

## 2024-08-05 ASSESSMENT — ENCOUNTER SYMPTOMS
COUGH: 0
AGITATION: 0
MUSCULOSKELETAL NEGATIVE: 1
CARDIOVASCULAR NEGATIVE: 1
DIARRHEA: 0
ABDOMINAL DISTENTION: 0
NAUSEA: 0
EYES NEGATIVE: 1
HEADACHES: 0
CHILLS: 0
BACK PAIN: 0
SHORTNESS OF BREATH: 0
VOMITING: 0
HEMATOLOGIC/LYMPHATIC NEGATIVE: 1
PSYCHIATRIC NEGATIVE: 1
GASTROINTESTINAL NEGATIVE: 1
CONFUSION: 0
ABDOMINAL PAIN: 0
FEVER: 0
NEUROLOGICAL NEGATIVE: 1
WHEEZING: 0
RESPIRATORY NEGATIVE: 1
ADENOPATHY: 0
JOINT SWELLING: 0
PALPITATIONS: 0
CONSTITUTIONAL NEGATIVE: 1
NUMBNESS: 0
EYE DISCHARGE: 0
ALLERGIC/IMMUNOLOGIC NEGATIVE: 1
NECK STIFFNESS: 0
DYSURIA: 0
ENDOCRINE NEGATIVE: 1
CONSTIPATION: 0
LIGHT-HEADEDNESS: 0

## 2024-08-05 ASSESSMENT — PATIENT HEALTH QUESTIONNAIRE - PHQ9
SUM OF ALL RESPONSES TO PHQ9 QUESTIONS 1 AND 2: 0
2. FEELING DOWN, DEPRESSED OR HOPELESS: NOT AT ALL
1. LITTLE INTEREST OR PLEASURE IN DOING THINGS: NOT AT ALL

## 2024-08-05 NOTE — PROGRESS NOTES
Subjective   Patient ID: Luis Felipe Moreno is a 59 y.o. male who presents for Follow-up (6 mo fu no labs done).  Here for fu   No labs done      Smokes less less 1/2 ppd        Review of Systems   Constitutional: Negative.  Negative for chills and fever.   HENT: Negative.  Negative for congestion.    Eyes: Negative.  Negative for discharge.   Respiratory: Negative.  Negative for cough, shortness of breath and wheezing.    Cardiovascular: Negative.  Negative for chest pain, palpitations and leg swelling.   Gastrointestinal: Negative.  Negative for abdominal distention, abdominal pain, constipation, diarrhea, nausea and vomiting.   Endocrine: Negative.    Genitourinary: Negative.  Negative for dysuria and urgency.   Musculoskeletal: Negative.  Negative for back pain, joint swelling and neck stiffness.   Skin: Negative.  Negative for rash.   Allergic/Immunologic: Negative.  Negative for immunocompromised state.   Neurological: Negative.  Negative for light-headedness, numbness and headaches.   Hematological: Negative.  Negative for adenopathy.   Psychiatric/Behavioral: Negative.  Negative for agitation, behavioral problems and confusion.    All other systems reviewed and are negative.      Objective   Physical Exam  Vitals reviewed.   Constitutional:       General: He is not in acute distress.     Appearance: Normal appearance.   HENT:      Head: Normocephalic and atraumatic.      Nose: Nose normal.   Eyes:      Conjunctiva/sclera: Conjunctivae normal.      Pupils: Pupils are equal, round, and reactive to light.   Neck:      Vascular: No carotid bruit.   Cardiovascular:      Rate and Rhythm: Normal rate and regular rhythm.      Pulses: Normal pulses.      Heart sounds:      No gallop.   Pulmonary:      Effort: Pulmonary effort is normal. No respiratory distress.      Breath sounds: Normal breath sounds. No wheezing.   Abdominal:      General: Bowel sounds are normal.      Palpations: Abdomen is soft.      Tenderness: There  "is no abdominal tenderness.   Musculoskeletal:         General: Normal range of motion.      Cervical back: Normal range of motion. No rigidity.   Lymphadenopathy:      Cervical: No cervical adenopathy.   Skin:     General: Skin is warm.      Findings: No rash.   Neurological:      General: No focal deficit present.      Mental Status: He is alert and oriented to person, place, and time.   Psychiatric:         Mood and Affect: Mood normal.         Behavior: Behavior normal.       /82   Pulse 70   Ht 1.803 m (5' 11\")   Wt 84.4 kg (186 lb)   BMI 25.94 kg/m²    No results found for: \"CBCDIF\", \"CMPLAS\", \"PSA\", \"LASA\", \"HGBA1C\"  Assessment/Plan   Problem List Items Addressed This Visit       Primary hypertension - Primary    Relevant Orders    CBC and Auto Differential    Comprehensive Metabolic Panel    Hypercholesterolemia    Relevant Orders    CBC and Auto Differential    Comprehensive Metabolic Panel    Lipid Panel    Chronic obstructive pulmonary disease (Multi)    Relevant Orders    CBC and Auto Differential    Comprehensive Metabolic Panel     Other Visit Diagnoses       Special screening for malignant neoplasm of prostate        Relevant Orders    Prostate Specific Antigen, Screen             I have counselled patient about need for smoking/tobacco cessation and how I can support efforts when patient is ready to quit.  Discussed nicotine replacement therapy, Varenicline, Bupropion, hypnosis, support groups, and acupuncture as potential options.  I spend 4 minutes counseling.    COPD IS CLINICALLY STABLE, CONTINUE SAME( has not been taking anoro, states does fine without it)      HTN addressed as follow:    MONITOR BP   GOAL BP LOWER THAN 130/80  LOW SALT  EXERCISE DAILY      MDM    1) COMPLEXITY: MORE THAN 1 STABLE CHRONIC CONDITION ADDRESSED  2)DATA: TESTS INTERPRETED AND OR ORDERED, TOOK INDEPENDENT HISTORY OR RECORDS REVIEWED  3)RISK: MODERATE RISK DUE TO NATURE OF MEDICAL CONDITIONS/COMORBIDITY OR " MEDICATIONS ORDERED OR SURGICAL OR PROCEDURE REFERRAL, .          Fu 1 mo bw

## 2024-08-13 ENCOUNTER — LAB (OUTPATIENT)
Dept: LAB | Facility: LAB | Age: 59
End: 2024-08-13
Payer: MEDICARE

## 2024-08-13 ENCOUNTER — OFFICE VISIT (OUTPATIENT)
Dept: PAIN MEDICINE | Facility: CLINIC | Age: 59
End: 2024-08-13
Payer: MEDICARE

## 2024-08-13 VITALS — DIASTOLIC BLOOD PRESSURE: 75 MMHG | HEART RATE: 84 BPM | SYSTOLIC BLOOD PRESSURE: 140 MMHG

## 2024-08-13 DIAGNOSIS — M51.26 RUPTURED LUMBAR INTERVERTEBRAL DISC: ICD-10-CM

## 2024-08-13 DIAGNOSIS — G62.9 NEUROPATHY: ICD-10-CM

## 2024-08-13 DIAGNOSIS — Z00.00 ROUTINE GENERAL MEDICAL EXAMINATION AT HEALTH CARE FACILITY: ICD-10-CM

## 2024-08-13 DIAGNOSIS — M51.36 DEGENERATION OF LUMBAR INTERVERTEBRAL DISC: ICD-10-CM

## 2024-08-13 DIAGNOSIS — Z12.5 SPECIAL SCREENING FOR MALIGNANT NEOPLASM OF PROSTATE: ICD-10-CM

## 2024-08-13 DIAGNOSIS — M51.17 INTERVERTEBRAL DISC DISORDER WITH RADICULOPATHY OF LUMBOSACRAL REGION: ICD-10-CM

## 2024-08-13 DIAGNOSIS — M54.17 LUMBOSACRAL RADICULOPATHY: Primary | ICD-10-CM

## 2024-08-13 DIAGNOSIS — M48.062 NEUROGENIC CLAUDICATION DUE TO LUMBAR SPINAL STENOSIS: ICD-10-CM

## 2024-08-13 DIAGNOSIS — N40.0 BENIGN PROSTATIC HYPERPLASIA, UNSPECIFIED WHETHER LOWER URINARY TRACT SYMPTOMS PRESENT: ICD-10-CM

## 2024-08-13 DIAGNOSIS — J44.9 CHRONIC OBSTRUCTIVE PULMONARY DISEASE, UNSPECIFIED COPD TYPE (MULTI): ICD-10-CM

## 2024-08-13 DIAGNOSIS — I10 PRIMARY HYPERTENSION: ICD-10-CM

## 2024-08-13 DIAGNOSIS — E78.00 HYPERCHOLESTEROLEMIA: ICD-10-CM

## 2024-08-13 PROBLEM — M51.369 DEGENERATION OF LUMBAR INTERVERTEBRAL DISC: Status: ACTIVE | Noted: 2024-08-13

## 2024-08-13 LAB
ALBUMIN SERPL BCP-MCNC: 4.4 G/DL (ref 3.4–5)
ALP SERPL-CCNC: 63 U/L (ref 33–136)
ALT SERPL W P-5'-P-CCNC: 17 U/L (ref 10–52)
ANION GAP SERPL CALC-SCNC: 10 MMOL/L (ref 10–20)
AST SERPL W P-5'-P-CCNC: 14 U/L (ref 9–39)
BASOPHILS # BLD AUTO: 0.07 X10*3/UL (ref 0–0.1)
BASOPHILS NFR BLD AUTO: 0.7 %
BILIRUB SERPL-MCNC: 0.5 MG/DL (ref 0–1.2)
BUN SERPL-MCNC: 9 MG/DL (ref 6–23)
CALCIUM SERPL-MCNC: 9.9 MG/DL (ref 8.6–10.3)
CHLORIDE SERPL-SCNC: 105 MMOL/L (ref 98–107)
CHOLEST SERPL-MCNC: 140 MG/DL (ref 0–199)
CHOLESTEROL/HDL RATIO: 2.5
CO2 SERPL-SCNC: 28 MMOL/L (ref 21–32)
CREAT SERPL-MCNC: 0.87 MG/DL (ref 0.5–1.3)
EGFRCR SERPLBLD CKD-EPI 2021: >90 ML/MIN/1.73M*2
EOSINOPHIL # BLD AUTO: 0.13 X10*3/UL (ref 0–0.7)
EOSINOPHIL NFR BLD AUTO: 1.2 %
ERYTHROCYTE [DISTWIDTH] IN BLOOD BY AUTOMATED COUNT: 12.4 % (ref 11.5–14.5)
GLUCOSE SERPL-MCNC: 115 MG/DL (ref 74–99)
HCT VFR BLD AUTO: 46.2 % (ref 41–52)
HDLC SERPL-MCNC: 56 MG/DL
HGB BLD-MCNC: 14.9 G/DL (ref 13.5–17.5)
IMM GRANULOCYTES # BLD AUTO: 0.08 X10*3/UL (ref 0–0.7)
IMM GRANULOCYTES NFR BLD AUTO: 0.8 % (ref 0–0.9)
LDLC SERPL CALC-MCNC: 55 MG/DL
LYMPHOCYTES # BLD AUTO: 1.9 X10*3/UL (ref 1.2–4.8)
LYMPHOCYTES NFR BLD AUTO: 18.1 %
MCH RBC QN AUTO: 31.7 PG (ref 26–34)
MCHC RBC AUTO-ENTMCNC: 32.3 G/DL (ref 32–36)
MCV RBC AUTO: 98 FL (ref 80–100)
MONOCYTES # BLD AUTO: 0.87 X10*3/UL (ref 0.1–1)
MONOCYTES NFR BLD AUTO: 8.3 %
NEUTROPHILS # BLD AUTO: 7.45 X10*3/UL (ref 1.2–7.7)
NEUTROPHILS NFR BLD AUTO: 70.9 %
NON HDL CHOLESTEROL: 84 MG/DL (ref 0–149)
NRBC BLD-RTO: 0 /100 WBCS (ref 0–0)
PLATELET # BLD AUTO: 288 X10*3/UL (ref 150–450)
POTASSIUM SERPL-SCNC: 5.2 MMOL/L (ref 3.5–5.3)
PROT SERPL-MCNC: 6.4 G/DL (ref 6.4–8.2)
PSA SERPL-MCNC: 1.14 NG/ML
RBC # BLD AUTO: 4.7 X10*6/UL (ref 4.5–5.9)
SODIUM SERPL-SCNC: 138 MMOL/L (ref 136–145)
TRIGL SERPL-MCNC: 145 MG/DL (ref 0–149)
VLDL: 29 MG/DL (ref 0–40)
WBC # BLD AUTO: 10.5 X10*3/UL (ref 4.4–11.3)

## 2024-08-13 PROCEDURE — 80061 LIPID PANEL: CPT

## 2024-08-13 PROCEDURE — 85025 COMPLETE CBC W/AUTO DIFF WBC: CPT

## 2024-08-13 PROCEDURE — 36415 COLL VENOUS BLD VENIPUNCTURE: CPT

## 2024-08-13 PROCEDURE — 80053 COMPREHEN METABOLIC PANEL: CPT

## 2024-08-13 PROCEDURE — G0103 PSA SCREENING: HCPCS

## 2024-08-13 PROCEDURE — 99214 OFFICE O/P EST MOD 30 MIN: CPT | Performed by: PHYSICIAN ASSISTANT

## 2024-08-13 RX ORDER — METHYLPREDNISOLONE ACETATE 40 MG/ML
40 INJECTION, SUSPENSION INTRA-ARTICULAR; INTRALESIONAL; INTRAMUSCULAR; SOFT TISSUE ONCE
OUTPATIENT
Start: 2024-08-13 | End: 2024-08-13

## 2024-08-13 RX ORDER — LIDOCAINE HYDROCHLORIDE 20 MG/ML
6 INJECTION, SOLUTION EPIDURAL; INFILTRATION; INTRACAUDAL; PERINEURAL ONCE
OUTPATIENT
Start: 2024-08-13 | End: 2024-08-13

## 2024-08-13 RX ORDER — SODIUM CHLORIDE, SODIUM LACTATE, POTASSIUM CHLORIDE, CALCIUM CHLORIDE 600; 310; 30; 20 MG/100ML; MG/100ML; MG/100ML; MG/100ML
20 INJECTION, SOLUTION INTRAVENOUS CONTINUOUS
OUTPATIENT
Start: 2024-08-13

## 2024-08-13 RX ORDER — SODIUM CHLORIDE 9 MG/ML
4 INJECTION, SOLUTION INTRAMUSCULAR; INTRAVENOUS; SUBCUTANEOUS ONCE
OUTPATIENT
Start: 2024-08-13 | End: 2024-08-13

## 2024-08-13 RX ORDER — PREGABALIN 50 MG/1
100 CAPSULE ORAL 2 TIMES DAILY
Qty: 120 CAPSULE | Refills: 1 | Status: SHIPPED | OUTPATIENT
Start: 2024-08-13

## 2024-08-13 ASSESSMENT — COLUMBIA-SUICIDE SEVERITY RATING SCALE - C-SSRS
2. HAVE YOU ACTUALLY HAD ANY THOUGHTS OF KILLING YOURSELF?: NO
6. HAVE YOU EVER DONE ANYTHING, STARTED TO DO ANYTHING, OR PREPARED TO DO ANYTHING TO END YOUR LIFE?: NO
1. IN THE PAST MONTH, HAVE YOU WISHED YOU WERE DEAD OR WISHED YOU COULD GO TO SLEEP AND NOT WAKE UP?: NO

## 2024-08-13 ASSESSMENT — ENCOUNTER SYMPTOMS
ARTHRALGIAS: 1
WEAKNESS: 1
NUMBNESS: 1
PSYCHIATRIC NEGATIVE: 1
RESPIRATORY NEGATIVE: 1
ALLERGIC/IMMUNOLOGIC NEGATIVE: 1
ENDOCRINE NEGATIVE: 1
CARDIOVASCULAR NEGATIVE: 1
HEMATOLOGIC/LYMPHATIC NEGATIVE: 1
GASTROINTESTINAL NEGATIVE: 1
CONSTITUTIONAL NEGATIVE: 1
MYALGIAS: 1
EYES NEGATIVE: 1

## 2024-08-13 NOTE — PROGRESS NOTES
Subjective   Patient ID: Luis Felipe Moreno is a 59 y.o. male who presents for Follow-up (FOLLOW UP ON LUMBAR MRI, SHARP PAIN THAT GOES DOWN THE BACK OF HIS RIGHT HIP INTO RIGHT THIGH DOWN TO HIS ANKLE, HE HAS PAIN WITH LAYING IN BED, THE PAIN IS BETTER WHEN HE IS UPRIGHT , STANDING HE IS OK, HE IS TAKING IBUPROFEN AND TYLENOL, ).HE TRIES HOME STRETCHING NO RELIEF, PAIN SCORE 8/10, AMIRA=62%    Sherri Pike, [facility] 08/13/24 8:04 AM     Patient is a 59-year-old male following up today after undergoing an updated lumbar MRI.  He continues to have lower back pain with right radiating leg pain.  He rates the discomfort an 8/10.  He states that his leg feels a ball.  It goes from his lateral hip to his ankle.  He feels like his calf is on fire.  He feels like there is needles coming out of his leg and he states that it is just unbearable.  He cannot do the things he wants to do because of this.  He states that life is unbearable.  Historically he underwent bilateral L5-S1 transforaminal epidural steroid injection.  This was done on 6/7/2024 and has given him 80% relief.  Unfortunate, his radicular symptoms very quickly returned and at this time have just been getting more and more intense.  He has previously trialed and failed all other reasonable conservative treatments.    It should be noted that historically, he underwent A left shoulder injection.  This was done at his last appointment.  He has given him 80% relief.          Review of Systems   Constitutional: Negative.    HENT: Negative.     Eyes: Negative.    Respiratory: Negative.     Cardiovascular: Negative.    Gastrointestinal: Negative.    Endocrine: Negative.    Genitourinary: Negative.    Musculoskeletal:  Positive for arthralgias and myalgias.   Skin: Negative.    Allergic/Immunologic: Negative.    Neurological:  Positive for weakness and numbness.   Hematological: Negative.    Psychiatric/Behavioral: Negative.         Objective   Physical Exam  Vitals and nursing  note reviewed.   Constitutional:       General: He is not in acute distress.     Appearance: Normal appearance. He is not ill-appearing.   HENT:      Head: Normocephalic and atraumatic.      Right Ear: External ear normal.      Left Ear: External ear normal.      Nose: Nose normal.      Mouth/Throat:      Pharynx: Oropharynx is clear.   Eyes:      Conjunctiva/sclera: Conjunctivae normal.   Cardiovascular:      Rate and Rhythm: Normal rate and regular rhythm.      Pulses: Normal pulses.   Pulmonary:      Effort: Pulmonary effort is normal.      Breath sounds: Normal breath sounds.   Musculoskeletal:         General: Normal range of motion.      Cervical back: Normal range of motion.      Comments: Unable to get comfortable.  Unable to sit.  Pacing around.   Right hip flexion, ADF and EHL 4/5  Positive right straight leg raise and pain with movement of the leg at all  Some pain with internal and external rotation.   Skin:     General: Skin is warm and dry.   Neurological:      General: No focal deficit present.      Mental Status: He is alert and oriented to person, place, and time. Mental status is at baseline.   Psychiatric:         Mood and Affect: Mood normal.         Behavior: Behavior normal.         Thought Content: Thought content normal.         Judgment: Judgment normal.         MR lumbar spine wo IV contrast  Status: Final result     PACS Images     Show images for MR lumbar spine wo IV contrast  Signed by    Signed Time Phone Pager   Osmany Farah MD 8/06/2024 17:06 [phone]      Exam Information    Status Exam Begun Exam Ended   Final 8/05/2024 14:10 8/05/2024 15:03     Study Result    Narrative & Impression   Interpreted By:  Osmany Farah,   STUDY:  MR LUMBAR SPINE WO IV CONTRAST;  8/5/2024 3:03 pm      INDICATION:  Signs/Symptoms:lower back and right leg pain.      COMPARISON:  06/30/2015.      ACCESSION NUMBER(S):  [id number]      ORDERING CLINICIAN:  ANAYA CORONEL      TECHNIQUE:  Sagittal  T1, T2, STIR, axial T1 and T2 weighted images of the lumbar  spine were acquired.      FINDINGS:  Alignment: The vertebral alignment is maintained.      Vertebrae/Intervertebral Discs: The vertebral bodies demonstrate  expected height. The marrow signal is within normal limits. Disc  desiccation involving all levels of the lumbar spine, particularly  involving L2-3, L3-4, L4 5, L5-S1.      Conus: The lower thoracic cord appears unremarkable. The conus  terminates at T12-L1.      T12-L1: Minimal broad-based posterior disc osteophyte complex, mild  hypertrophy of the facet joints, causing mild deformity of the thecal  sac. Mild bilateral neural foramina narrowing. No encroachment of the  exiting nerve roots.      L1-2: Minimal broad-based posterior disc osteophyte complex, mild  hypertrophy of the facet joints, causing mild deformity of the thecal  sac. Mild bilateral neural foramina narrowing. No encroachment of the  exiting nerve roots.      L2-3: Mild-to-moderate broad-based posterior disc osteophyte complex,  moderate hypertrophy of the facet joints, causing mild to moderate  deformity of the thecal sac. Mild to moderate bilateral neural  foramina narrowing, causing mild encroachment of the exiting nerve  roots.      L3-4: Moderate broad-based posterior disc osteophyte complex,  mild-to-moderate hypertrophy of the facet joints, causing  mild-to-moderate deformity of the thecal sac. Mild-to-moderate  bilateral neural foramina narrowing, causing mild encroachment of the  exiting nerve roots.      L4-5: 6 mm broad-based disc protrusion, moderate hypertrophy of the  facet joints, thickened ligamentum flavum, causing moderate deformity  of the thecal sac and the effacement of the bilateral traversing  nerve roots.. Moderate bilateral neural foramina narrowing, causing  mild to moderate encroachment of the exiting nerve roots.      L5-S1: 8 mm right paracentral disc herniation, moderate hypertrophy  of the facet joints,  thickened ligamentum flavum, causing moderate  deformity of the thecal sac and the effacement of the right-sided  traversing nerve roots.. Moderate bilateral neural foramina  narrowing, causing mild to moderate encroachment of the exiting nerve  roots.          IMPRESSION:  6 mm broad-based disc protrusion at L4-5, along with moderate  hypertrophy of the facet joints, thickened ligamentum flavum, causing  moderate deformity of the thecal sac and the effacement of the  bilateral traversing nerve roots.. Moderate bilateral neural foramina  narrowing, causing mild to moderate encroachment of the exiting nerve  roots.      8 mm right paracentral disc herniation at L5-S1, along with moderate  hypertrophy of the facet joints, thickened ligamentum flavum, causing  moderate deformity of the thecal sac and the effacement of the  right-sided traversing nerve roots.. Moderate bilateral neural  foramina narrowing, causing mild to moderate encroachment of the  exiting nerve roots.      Mild-to-moderate central spinal stenosis, bilateral neural foraminal  narrowing at L2-3, L3-4, causing mild encroachment of the bilateral  exiting nerve roots.          Signed by: Osmany Farah 8/6/2024 5:06 PM  Dictation workstation:   [id number]     Assessment/Plan   Diagnoses and all orders for this visit:  Lumbosacral radiculopathy  -     Epidural Steroid Injection; Future  -     FL pain management; Future  -     pregabalin (Lyrica) 50 mg capsule; Take 2 capsules (100 mg) by mouth 2 times a day.  Ruptured lumbar intervertebral disc  -     Epidural Steroid Injection; Future  -     FL pain management; Future  Degeneration of lumbar intervertebral disc  -     Epidural Steroid Injection; Future  -     FL pain management; Future  Neurogenic claudication due to lumbar spinal stenosis  -     Epidural Steroid Injection; Future  -     FL pain management; Future  Intervertebral disc disorder with radiculopathy of lumbosacral region  -     pregabalin  (Lyrica) 50 mg capsule; Take 2 capsules (100 mg) by mouth 2 times a day.  Neuropathy  -     pregabalin (Lyrica) 50 mg capsule; Take 2 capsules (100 mg) by mouth 2 times a day.  Other orders  -     NPO Diet Except: Sips with meds; Effective now; Standing  -     Height and weight; Standing  -     Insert and maintain peripheral IV; Standing  -     Saline lock IV; Standing  -     Type And Screen; Standing  -     Inpatient consult to Respiratory Care; Standing  -     lactated Ringer's infusion  -     Adult diet Regular; Standing  -     Vital Signs; Standing  -     Notify physician - Standard Parameters; Standing  -     Continue IV fluids ordered pre-procedure; Standing  -     Prior to Discharge O2 Weaning; Standing  -     Pulse oximetry, continuous; Standing  -     Discharge patient; Standing  -     iohexol (OMNIPaque) 300 mg iodine/mL solution 3 mL  -     lidocaine PF (Xylocaine) 20 mg/mL (2 %) injection 120 mg  -     sodium chloride (PF) 0.9% solution 4 mL  -     methylPREDNISolone acetate (DEPO-Medrol) injection 40 mg       Patient is a 59-year-old male with the above-mentioned medical diagnoses.  Patient underwent an updated lumbar MRI.  Reviewed the MRI.  We discussed different options.  At this time he continues to have right lower back pain with right radiating leg pain that affects his ability to do things.  Affects his quality life.  Affects his ability to get comfortable.  He has previously trialed and failed all reasonable conservative treatments.  At this time, based on his imaging finds, his pain pattern and his failure to improve with conservative treatments are recommended to patient an L5-S1 epidural steroid injection under fluoroscopy for both diagnostic and therapeutic purposes.  Procedure was discussed purpose and benefits were discussed.  Patient is agreeable.  He will follow-up 2 weeks after the injection for reevaluation.  In the meantime we will also increase his Lyrica.  100 mg twice daily.  OARRS  reviewed.  How to increase the medication was discussed.  Side effects discussed.  He will follow-up as above mention.

## 2024-08-13 NOTE — H&P (VIEW-ONLY)
Subjective   Patient ID: Luis Felipe Moreno is a 59 y.o. male who presents for Follow-up (FOLLOW UP ON LUMBAR MRI, SHARP PAIN THAT GOES DOWN THE BACK OF HIS RIGHT HIP INTO RIGHT THIGH DOWN TO HIS ANKLE, HE HAS PAIN WITH LAYING IN BED, THE PAIN IS BETTER WHEN HE IS UPRIGHT , STANDING HE IS OK, HE IS TAKING IBUPROFEN AND TYLENOL, ).HE TRIES HOME STRETCHING NO RELIEF, PAIN SCORE 8/10, AMIRA=62%    Sherri Pike, [facility] 08/13/24 8:04 AM     Patient is a 59-year-old male following up today after undergoing an updated lumbar MRI.  He continues to have lower back pain with right radiating leg pain.  He rates the discomfort an 8/10.  He states that his leg feels a ball.  It goes from his lateral hip to his ankle.  He feels like his calf is on fire.  He feels like there is needles coming out of his leg and he states that it is just unbearable.  He cannot do the things he wants to do because of this.  He states that life is unbearable.  Historically he underwent bilateral L5-S1 transforaminal epidural steroid injection.  This was done on 6/7/2024 and has given him 80% relief.  Unfortunate, his radicular symptoms very quickly returned and at this time have just been getting more and more intense.  He has previously trialed and failed all other reasonable conservative treatments.    It should be noted that historically, he underwent A left shoulder injection.  This was done at his last appointment.  He has given him 80% relief.          Review of Systems   Constitutional: Negative.    HENT: Negative.     Eyes: Negative.    Respiratory: Negative.     Cardiovascular: Negative.    Gastrointestinal: Negative.    Endocrine: Negative.    Genitourinary: Negative.    Musculoskeletal:  Positive for arthralgias and myalgias.   Skin: Negative.    Allergic/Immunologic: Negative.    Neurological:  Positive for weakness and numbness.   Hematological: Negative.    Psychiatric/Behavioral: Negative.         Objective   Physical Exam  Vitals and nursing  note reviewed.   Constitutional:       General: He is not in acute distress.     Appearance: Normal appearance. He is not ill-appearing.   HENT:      Head: Normocephalic and atraumatic.      Right Ear: External ear normal.      Left Ear: External ear normal.      Nose: Nose normal.      Mouth/Throat:      Pharynx: Oropharynx is clear.   Eyes:      Conjunctiva/sclera: Conjunctivae normal.   Cardiovascular:      Rate and Rhythm: Normal rate and regular rhythm.      Pulses: Normal pulses.   Pulmonary:      Effort: Pulmonary effort is normal.      Breath sounds: Normal breath sounds.   Musculoskeletal:         General: Normal range of motion.      Cervical back: Normal range of motion.      Comments: Unable to get comfortable.  Unable to sit.  Pacing around.   Right hip flexion, ADF and EHL 4/5  Positive right straight leg raise and pain with movement of the leg at all  Some pain with internal and external rotation.   Skin:     General: Skin is warm and dry.   Neurological:      General: No focal deficit present.      Mental Status: He is alert and oriented to person, place, and time. Mental status is at baseline.   Psychiatric:         Mood and Affect: Mood normal.         Behavior: Behavior normal.         Thought Content: Thought content normal.         Judgment: Judgment normal.         MR lumbar spine wo IV contrast  Status: Final result     PACS Images     Show images for MR lumbar spine wo IV contrast  Signed by    Signed Time Phone Pager   Osmany Farah MD 8/06/2024 17:06 [phone]      Exam Information    Status Exam Begun Exam Ended   Final 8/05/2024 14:10 8/05/2024 15:03     Study Result    Narrative & Impression   Interpreted By:  Osmany Farah,   STUDY:  MR LUMBAR SPINE WO IV CONTRAST;  8/5/2024 3:03 pm      INDICATION:  Signs/Symptoms:lower back and right leg pain.      COMPARISON:  06/30/2015.      ACCESSION NUMBER(S):  [id number]      ORDERING CLINICIAN:  ANAYA CORONEL      TECHNIQUE:  Sagittal  T1, T2, STIR, axial T1 and T2 weighted images of the lumbar  spine were acquired.      FINDINGS:  Alignment: The vertebral alignment is maintained.      Vertebrae/Intervertebral Discs: The vertebral bodies demonstrate  expected height. The marrow signal is within normal limits. Disc  desiccation involving all levels of the lumbar spine, particularly  involving L2-3, L3-4, L4 5, L5-S1.      Conus: The lower thoracic cord appears unremarkable. The conus  terminates at T12-L1.      T12-L1: Minimal broad-based posterior disc osteophyte complex, mild  hypertrophy of the facet joints, causing mild deformity of the thecal  sac. Mild bilateral neural foramina narrowing. No encroachment of the  exiting nerve roots.      L1-2: Minimal broad-based posterior disc osteophyte complex, mild  hypertrophy of the facet joints, causing mild deformity of the thecal  sac. Mild bilateral neural foramina narrowing. No encroachment of the  exiting nerve roots.      L2-3: Mild-to-moderate broad-based posterior disc osteophyte complex,  moderate hypertrophy of the facet joints, causing mild to moderate  deformity of the thecal sac. Mild to moderate bilateral neural  foramina narrowing, causing mild encroachment of the exiting nerve  roots.      L3-4: Moderate broad-based posterior disc osteophyte complex,  mild-to-moderate hypertrophy of the facet joints, causing  mild-to-moderate deformity of the thecal sac. Mild-to-moderate  bilateral neural foramina narrowing, causing mild encroachment of the  exiting nerve roots.      L4-5: 6 mm broad-based disc protrusion, moderate hypertrophy of the  facet joints, thickened ligamentum flavum, causing moderate deformity  of the thecal sac and the effacement of the bilateral traversing  nerve roots.. Moderate bilateral neural foramina narrowing, causing  mild to moderate encroachment of the exiting nerve roots.      L5-S1: 8 mm right paracentral disc herniation, moderate hypertrophy  of the facet joints,  thickened ligamentum flavum, causing moderate  deformity of the thecal sac and the effacement of the right-sided  traversing nerve roots.. Moderate bilateral neural foramina  narrowing, causing mild to moderate encroachment of the exiting nerve  roots.          IMPRESSION:  6 mm broad-based disc protrusion at L4-5, along with moderate  hypertrophy of the facet joints, thickened ligamentum flavum, causing  moderate deformity of the thecal sac and the effacement of the  bilateral traversing nerve roots.. Moderate bilateral neural foramina  narrowing, causing mild to moderate encroachment of the exiting nerve  roots.      8 mm right paracentral disc herniation at L5-S1, along with moderate  hypertrophy of the facet joints, thickened ligamentum flavum, causing  moderate deformity of the thecal sac and the effacement of the  right-sided traversing nerve roots.. Moderate bilateral neural  foramina narrowing, causing mild to moderate encroachment of the  exiting nerve roots.      Mild-to-moderate central spinal stenosis, bilateral neural foraminal  narrowing at L2-3, L3-4, causing mild encroachment of the bilateral  exiting nerve roots.          Signed by: Osmany Farah 8/6/2024 5:06 PM  Dictation workstation:   [id number]     Assessment/Plan   Diagnoses and all orders for this visit:  Lumbosacral radiculopathy  -     Epidural Steroid Injection; Future  -     FL pain management; Future  -     pregabalin (Lyrica) 50 mg capsule; Take 2 capsules (100 mg) by mouth 2 times a day.  Ruptured lumbar intervertebral disc  -     Epidural Steroid Injection; Future  -     FL pain management; Future  Degeneration of lumbar intervertebral disc  -     Epidural Steroid Injection; Future  -     FL pain management; Future  Neurogenic claudication due to lumbar spinal stenosis  -     Epidural Steroid Injection; Future  -     FL pain management; Future  Intervertebral disc disorder with radiculopathy of lumbosacral region  -     pregabalin  (Lyrica) 50 mg capsule; Take 2 capsules (100 mg) by mouth 2 times a day.  Neuropathy  -     pregabalin (Lyrica) 50 mg capsule; Take 2 capsules (100 mg) by mouth 2 times a day.  Other orders  -     NPO Diet Except: Sips with meds; Effective now; Standing  -     Height and weight; Standing  -     Insert and maintain peripheral IV; Standing  -     Saline lock IV; Standing  -     Type And Screen; Standing  -     Inpatient consult to Respiratory Care; Standing  -     lactated Ringer's infusion  -     Adult diet Regular; Standing  -     Vital Signs; Standing  -     Notify physician - Standard Parameters; Standing  -     Continue IV fluids ordered pre-procedure; Standing  -     Prior to Discharge O2 Weaning; Standing  -     Pulse oximetry, continuous; Standing  -     Discharge patient; Standing  -     iohexol (OMNIPaque) 300 mg iodine/mL solution 3 mL  -     lidocaine PF (Xylocaine) 20 mg/mL (2 %) injection 120 mg  -     sodium chloride (PF) 0.9% solution 4 mL  -     methylPREDNISolone acetate (DEPO-Medrol) injection 40 mg       Patient is a 59-year-old male with the above-mentioned medical diagnoses.  Patient underwent an updated lumbar MRI.  Reviewed the MRI.  We discussed different options.  At this time he continues to have right lower back pain with right radiating leg pain that affects his ability to do things.  Affects his quality life.  Affects his ability to get comfortable.  He has previously trialed and failed all reasonable conservative treatments.  At this time, based on his imaging finds, his pain pattern and his failure to improve with conservative treatments are recommended to patient an L5-S1 epidural steroid injection under fluoroscopy for both diagnostic and therapeutic purposes.  Procedure was discussed purpose and benefits were discussed.  Patient is agreeable.  He will follow-up 2 weeks after the injection for reevaluation.  In the meantime we will also increase his Lyrica.  100 mg twice daily.  OARRS  reviewed.  How to increase the medication was discussed.  Side effects discussed.  He will follow-up as above mention.

## 2024-08-16 ENCOUNTER — TELEPHONE (OUTPATIENT)
Dept: PAIN MEDICINE | Facility: CLINIC | Age: 59
End: 2024-08-16
Payer: MEDICARE

## 2024-08-30 ENCOUNTER — HOSPITAL ENCOUNTER (OUTPATIENT)
Dept: OPERATING ROOM | Facility: HOSPITAL | Age: 59
Setting detail: OUTPATIENT SURGERY
Discharge: HOME | End: 2024-08-30
Payer: MEDICARE

## 2024-08-30 VITALS
TEMPERATURE: 97.7 F | HEART RATE: 62 BPM | OXYGEN SATURATION: 95 % | HEIGHT: 71 IN | DIASTOLIC BLOOD PRESSURE: 75 MMHG | SYSTOLIC BLOOD PRESSURE: 138 MMHG | BODY MASS INDEX: 26.6 KG/M2 | WEIGHT: 190 LBS | RESPIRATION RATE: 16 BRPM

## 2024-08-30 DIAGNOSIS — M48.062 NEUROGENIC CLAUDICATION DUE TO LUMBAR SPINAL STENOSIS: ICD-10-CM

## 2024-08-30 DIAGNOSIS — M51.36 DEGENERATION OF LUMBAR INTERVERTEBRAL DISC: ICD-10-CM

## 2024-08-30 DIAGNOSIS — M51.26 RUPTURED LUMBAR INTERVERTEBRAL DISC: ICD-10-CM

## 2024-08-30 DIAGNOSIS — M54.17 LUMBOSACRAL RADICULOPATHY: ICD-10-CM

## 2024-08-30 PROCEDURE — 62323 NJX INTERLAMINAR LMBR/SAC: CPT | Performed by: STUDENT IN AN ORGANIZED HEALTH CARE EDUCATION/TRAINING PROGRAM

## 2024-08-30 PROCEDURE — 2550000001 HC RX 255 CONTRASTS: Performed by: PHYSICIAN ASSISTANT

## 2024-08-30 PROCEDURE — 2500000005 HC RX 250 GENERAL PHARMACY W/O HCPCS: Performed by: STUDENT IN AN ORGANIZED HEALTH CARE EDUCATION/TRAINING PROGRAM

## 2024-08-30 PROCEDURE — 2500000004 HC RX 250 GENERAL PHARMACY W/ HCPCS (ALT 636 FOR OP/ED): Performed by: STUDENT IN AN ORGANIZED HEALTH CARE EDUCATION/TRAINING PROGRAM

## 2024-08-30 RX ORDER — SODIUM CHLORIDE 9 MG/ML
4 INJECTION, SOLUTION INTRAMUSCULAR; INTRAVENOUS; SUBCUTANEOUS ONCE
Status: DISCONTINUED | OUTPATIENT
Start: 2024-08-30 | End: 2024-08-31 | Stop reason: HOSPADM

## 2024-08-30 RX ORDER — LIDOCAINE HYDROCHLORIDE 20 MG/ML
INJECTION, SOLUTION EPIDURAL; INFILTRATION; INTRACAUDAL; PERINEURAL AS NEEDED
Status: COMPLETED | OUTPATIENT
Start: 2024-08-30 | End: 2024-08-30

## 2024-08-30 RX ORDER — SODIUM CHLORIDE 9 MG/ML
INJECTION, SOLUTION INTRAMUSCULAR; INTRAVENOUS; SUBCUTANEOUS AS NEEDED
Status: COMPLETED | OUTPATIENT
Start: 2024-08-30 | End: 2024-08-30

## 2024-08-30 RX ORDER — METHYLPREDNISOLONE ACETATE 40 MG/ML
INJECTION, SUSPENSION INTRA-ARTICULAR; INTRALESIONAL; INTRAMUSCULAR; SOFT TISSUE AS NEEDED
Status: COMPLETED | OUTPATIENT
Start: 2024-08-30 | End: 2024-08-30

## 2024-08-30 RX ORDER — LIDOCAINE HYDROCHLORIDE 20 MG/ML
6 INJECTION, SOLUTION EPIDURAL; INFILTRATION; INTRACAUDAL; PERINEURAL ONCE
Status: DISCONTINUED | OUTPATIENT
Start: 2024-08-30 | End: 2024-08-31 | Stop reason: HOSPADM

## 2024-08-30 RX ORDER — METHYLPREDNISOLONE ACETATE 40 MG/ML
40 INJECTION, SUSPENSION INTRA-ARTICULAR; INTRALESIONAL; INTRAMUSCULAR; SOFT TISSUE ONCE
Status: DISCONTINUED | OUTPATIENT
Start: 2024-08-30 | End: 2024-08-31 | Stop reason: HOSPADM

## 2024-08-30 ASSESSMENT — PATIENT HEALTH QUESTIONNAIRE - PHQ9
SUM OF ALL RESPONSES TO PHQ9 QUESTIONS 1 AND 2: 2
1. LITTLE INTEREST OR PLEASURE IN DOING THINGS: MORE THAN HALF THE DAYS
2. FEELING DOWN, DEPRESSED OR HOPELESS: NOT AT ALL
10. IF YOU CHECKED OFF ANY PROBLEMS, HOW DIFFICULT HAVE THESE PROBLEMS MADE IT FOR YOU TO DO YOUR WORK, TAKE CARE OF THINGS AT HOME, OR GET ALONG WITH OTHER PEOPLE: VERY DIFFICULT

## 2024-08-30 ASSESSMENT — ENCOUNTER SYMPTOMS
LOSS OF SENSATION IN FEET: 1
OCCASIONAL FEELINGS OF UNSTEADINESS: 0
DEPRESSION: 0

## 2024-08-30 ASSESSMENT — PAIN SCALES - GENERAL
PAINLEVEL_OUTOF10: 7
PAINLEVEL_OUTOF10: 0 - NO PAIN

## 2024-08-30 ASSESSMENT — PAIN - FUNCTIONAL ASSESSMENT
PAIN_FUNCTIONAL_ASSESSMENT: 0-10
PAIN_FUNCTIONAL_ASSESSMENT: 0-10

## 2024-08-30 ASSESSMENT — PAIN DESCRIPTION - DESCRIPTORS: DESCRIPTORS: RADIATING;ACHING;STABBING

## 2024-08-30 NOTE — Clinical Note
Pt arrived to OR on cart. Transferred to OR table. Safety strap applied. Pt padded with pillows in prone  position. Pt prepped with Chloraprep via Dr. Matthews. Bandaids applied to injection sites. Pt. Transferred to ACS on cart.

## 2024-09-05 ENCOUNTER — APPOINTMENT (OUTPATIENT)
Dept: PRIMARY CARE | Facility: CLINIC | Age: 59
End: 2024-09-05
Payer: MEDICARE

## 2024-09-17 ENCOUNTER — APPOINTMENT (OUTPATIENT)
Dept: PRIMARY CARE | Facility: CLINIC | Age: 59
End: 2024-09-17
Payer: MEDICARE

## 2024-10-03 ENCOUNTER — APPOINTMENT (OUTPATIENT)
Dept: PRIMARY CARE | Facility: CLINIC | Age: 59
End: 2024-10-03
Payer: MEDICARE

## 2024-10-03 VITALS
DIASTOLIC BLOOD PRESSURE: 79 MMHG | WEIGHT: 192 LBS | HEART RATE: 62 BPM | HEIGHT: 71 IN | SYSTOLIC BLOOD PRESSURE: 154 MMHG | BODY MASS INDEX: 26.88 KG/M2

## 2024-10-03 DIAGNOSIS — I10 PRIMARY HYPERTENSION: Primary | ICD-10-CM

## 2024-10-03 DIAGNOSIS — E78.00 HYPERCHOLESTEROLEMIA: ICD-10-CM

## 2024-10-03 DIAGNOSIS — J44.9 CHRONIC OBSTRUCTIVE PULMONARY DISEASE, UNSPECIFIED COPD TYPE (MULTI): ICD-10-CM

## 2024-10-03 DIAGNOSIS — R73.01 IFG (IMPAIRED FASTING GLUCOSE): ICD-10-CM

## 2024-10-03 PROCEDURE — 3077F SYST BP >= 140 MM HG: CPT | Performed by: INTERNAL MEDICINE

## 2024-10-03 PROCEDURE — 99214 OFFICE O/P EST MOD 30 MIN: CPT | Performed by: INTERNAL MEDICINE

## 2024-10-03 PROCEDURE — 4004F PT TOBACCO SCREEN RCVD TLK: CPT | Performed by: INTERNAL MEDICINE

## 2024-10-03 PROCEDURE — 3078F DIAST BP <80 MM HG: CPT | Performed by: INTERNAL MEDICINE

## 2024-10-03 PROCEDURE — 3008F BODY MASS INDEX DOCD: CPT | Performed by: INTERNAL MEDICINE

## 2024-10-03 RX ORDER — LISINOPRIL 20 MG/1
20 TABLET ORAL DAILY
Qty: 90 TABLET | Refills: 3 | Status: SHIPPED | OUTPATIENT
Start: 2024-10-03 | End: 2025-10-03

## 2024-10-03 ASSESSMENT — ENCOUNTER SYMPTOMS
AGITATION: 0
CONFUSION: 0
PSYCHIATRIC NEGATIVE: 1
NUMBNESS: 0
ABDOMINAL PAIN: 0
CHILLS: 0
CONSTIPATION: 0
WHEEZING: 0
FEVER: 0
EYE DISCHARGE: 0
PALPITATIONS: 0
NEUROLOGICAL NEGATIVE: 1
NECK STIFFNESS: 0
VOMITING: 0
MUSCULOSKELETAL NEGATIVE: 1
HEADACHES: 0
CONSTITUTIONAL NEGATIVE: 1
ALLERGIC/IMMUNOLOGIC NEGATIVE: 1
NAUSEA: 0
LIGHT-HEADEDNESS: 0
GASTROINTESTINAL NEGATIVE: 1
SHORTNESS OF BREATH: 0
EYES NEGATIVE: 1
RESPIRATORY NEGATIVE: 1
ADENOPATHY: 0
CARDIOVASCULAR NEGATIVE: 1
HEMATOLOGIC/LYMPHATIC NEGATIVE: 1
JOINT SWELLING: 0
DYSURIA: 0
COUGH: 0
DIARRHEA: 0
ENDOCRINE NEGATIVE: 1
BACK PAIN: 0
ABDOMINAL DISTENTION: 0

## 2024-10-03 NOTE — PROGRESS NOTES
Subjective   Patient ID: Luis Felipe Moreno is a 59 y.o. male who presents for Follow-up (1 mo fu lab).  HERE FOR FU WITH LABS        Review of Systems   Constitutional: Negative.  Negative for chills and fever.   HENT: Negative.  Negative for congestion.    Eyes: Negative.  Negative for discharge.   Respiratory: Negative.  Negative for cough, shortness of breath and wheezing.    Cardiovascular: Negative.  Negative for chest pain, palpitations and leg swelling.   Gastrointestinal: Negative.  Negative for abdominal distention, abdominal pain, constipation, diarrhea, nausea and vomiting.   Endocrine: Negative.    Genitourinary: Negative.  Negative for dysuria and urgency.   Musculoskeletal: Negative.  Negative for back pain, joint swelling and neck stiffness.   Skin: Negative.  Negative for rash.   Allergic/Immunologic: Negative.  Negative for immunocompromised state.   Neurological: Negative.  Negative for light-headedness, numbness and headaches.   Hematological: Negative.  Negative for adenopathy.   Psychiatric/Behavioral: Negative.  Negative for agitation, behavioral problems and confusion.    All other systems reviewed and are negative.      Objective   Physical Exam  Vitals reviewed.   Constitutional:       General: He is not in acute distress.     Appearance: Normal appearance.   HENT:      Head: Normocephalic and atraumatic.      Nose: Nose normal.   Eyes:      Conjunctiva/sclera: Conjunctivae normal.      Pupils: Pupils are equal, round, and reactive to light.   Neck:      Vascular: No carotid bruit.   Cardiovascular:      Rate and Rhythm: Normal rate and regular rhythm.      Pulses: Normal pulses.      Heart sounds:      No gallop.   Pulmonary:      Effort: Pulmonary effort is normal. No respiratory distress.      Breath sounds: Normal breath sounds. No wheezing.   Abdominal:      General: Bowel sounds are normal.      Palpations: Abdomen is soft.      Tenderness: There is no abdominal tenderness.  "  Musculoskeletal:         General: Normal range of motion.      Cervical back: Normal range of motion. No rigidity.   Lymphadenopathy:      Cervical: No cervical adenopathy.   Skin:     General: Skin is warm.      Findings: No rash.   Neurological:      General: No focal deficit present.      Mental Status: He is alert and oriented to person, place, and time.   Psychiatric:         Mood and Affect: Mood normal.         Behavior: Behavior normal.       /79 (BP Location: Left arm, Patient Position: Sitting)   Pulse 62   Ht 1.803 m (5' 11\")   Wt 87.1 kg (192 lb)   BMI 26.78 kg/m²    No results found for: \"CBCDIF\", \"CMPLAS\", \"PSA\", \"LASA\", \"HGBA1C\"  Assessment/Plan   Problem List Items Addressed This Visit       Primary hypertension - Primary    Relevant Medications    lisinopril 20 mg tablet    Hypercholesterolemia    Chronic obstructive pulmonary disease (Multi)    IFG (impaired fasting glucose)        Labs reviewed with pt      HTN addressed as follow:    MONITOR BP   GOAL BP LOWER THAN 130/80  LOW SALT  EXERCISE DAILY    COPD IS CLINICALLY STABLE, CONTINUE SAME    Diabetes Mellitus/IFG addressed as follow:    1800 KATI ADA  HGA1C GOAL LESS THAN 7  LOSE WT  EXERCISE DAILY        I have counselled patient about need for smoking/tobacco cessation and how I can support efforts when patient is ready to quit.  Discussed nicotine replacement therapy, Varenicline, Bupropion, hypnosis, support groups, and acupuncture as potential options.  I spend 4 minutes counseling.      Stop by the office in a couple of weeks for bp check    Colonoscopy offered wants to wait for another year    Fu 6 mo   "

## 2024-12-31 ENCOUNTER — APPOINTMENT (OUTPATIENT)
Dept: PRIMARY CARE | Facility: CLINIC | Age: 59
End: 2024-12-31
Payer: MEDICARE

## 2024-12-31 VITALS — BODY MASS INDEX: 26.88 KG/M2 | HEIGHT: 71 IN | WEIGHT: 192 LBS

## 2024-12-31 DIAGNOSIS — J01.10 ACUTE NON-RECURRENT FRONTAL SINUSITIS: Primary | ICD-10-CM

## 2024-12-31 DIAGNOSIS — J44.9 CHRONIC OBSTRUCTIVE PULMONARY DISEASE, UNSPECIFIED COPD TYPE (MULTI): ICD-10-CM

## 2024-12-31 DIAGNOSIS — I10 PRIMARY HYPERTENSION: ICD-10-CM

## 2024-12-31 DIAGNOSIS — R73.01 IFG (IMPAIRED FASTING GLUCOSE): ICD-10-CM

## 2024-12-31 PROCEDURE — 3008F BODY MASS INDEX DOCD: CPT | Performed by: PHYSICIAN ASSISTANT

## 2024-12-31 PROCEDURE — 4004F PT TOBACCO SCREEN RCVD TLK: CPT | Performed by: PHYSICIAN ASSISTANT

## 2024-12-31 PROCEDURE — 99214 OFFICE O/P EST MOD 30 MIN: CPT | Performed by: PHYSICIAN ASSISTANT

## 2024-12-31 RX ORDER — AZITHROMYCIN 250 MG/1
TABLET, FILM COATED ORAL
Qty: 6 TABLET | Refills: 0 | Status: SHIPPED | OUTPATIENT
Start: 2024-12-31

## 2024-12-31 RX ORDER — METHYLPREDNISOLONE 4 MG/1
TABLET ORAL
Qty: 21 TABLET | Refills: 0 | Status: SHIPPED | OUTPATIENT
Start: 2024-12-31 | End: 2025-01-06

## 2024-12-31 ASSESSMENT — ENCOUNTER SYMPTOMS
SHORTNESS OF BREATH: 0
FEVER: 0
VOMITING: 0
SLEEP DISTURBANCE: 0
FREQUENCY: 0
SINUS PAIN: 1
DIARRHEA: 0
SORE THROAT: 0
CONFUSION: 0
BRUISES/BLEEDS EASILY: 0
WHEEZING: 0
WOUND: 0
DIZZINESS: 0
EYE DISCHARGE: 0
EYE REDNESS: 0
NAUSEA: 0
FLANK PAIN: 0
PALPITATIONS: 0
CHILLS: 0
NUMBNESS: 0
ABDOMINAL PAIN: 0
SINUS PRESSURE: 1
NECK PAIN: 0
BACK PAIN: 0
CONSTIPATION: 0
RHINORRHEA: 0
COUGH: 1
TREMORS: 0
CHEST TIGHTNESS: 0
FATIGUE: 0
HEADACHES: 1

## 2024-12-31 NOTE — PROGRESS NOTES
An interactive audio and video telecommunication system which permits real time communication between the patient (at home) and the provider (at the office) was utilized to provide this telehealth service   Virtual or Telephone Consent      Verbal consent was requested and obtained from Luis Felipe Moreno on this date, 12/31/24 for a telehealth visit.   Subjective   Patient ID: Luis Felipe Moreno is a 59 y.o. male who presents for Illness (VIRTUAL VISIT - C/O ILLNESS X 3 WEEKS. SINUS CONGESTION AND DRAINAGE OFF/ON. HAD OLD ABX AT HOME THAT HE USED BUT LITTLE IMPROVEMENT. )    Illness  Associated symptoms include congestion, headaches and coughing. Pertinent negatives include no eye discharge, eye redness, rhinorrhea, sore throat, fatigue, fever, chest pain, shortness of breath, wheezing, abdominal pain, constipation, diarrhea, nausea, vomiting, neck pain or rash.     Patient presents today as Dr Martin patient for   Illness x 3 weeks   Sinus congestion, PND,   Cough +production more in morning   Mild diarrhea on and off   Pt denies fever, N/V  Old antibiotic - pt thinks was amoxil and took a few remaining doses he had with some relief but did not have a full course     Med check   HTN - on meds   Glucose intolerance- ok for steroid if needed given prior labs and not due for labs for a few month   COPD - rescue inhaler - denies cough being exacerbated to needing more than reg meds     Patient Active Problem List   Diagnosis    Arthropathy of left shoulder    Bilateral carpal tunnel syndrome    Intervertebral disc disorder with radiculopathy of lumbosacral region    Lumbosacral radiculopathy    Lateral epicondylitis of left elbow    Lumbosacral spondylosis    Salivary secretion disturbance    Neuropathy    Sensory disturbance    Primary hypertension    Benign prostatic hyperplasia    Hypercholesterolemia    Chronic obstructive pulmonary disease (Multi)    Trochanteric bursitis of both hips    Ruptured lumbar intervertebral  disc    Neurogenic claudication due to lumbar spinal stenosis    Degeneration of lumbar intervertebral disc    IFG (impaired fasting glucose)       Review of Systems   Constitutional:  Negative for chills, fatigue and fever.   HENT:  Positive for congestion, postnasal drip, sinus pressure and sinus pain. Negative for rhinorrhea, sore throat and tinnitus.    Eyes:  Negative for discharge, redness and visual disturbance.   Respiratory:  Positive for cough. Negative for chest tightness, shortness of breath and wheezing.    Cardiovascular:  Negative for chest pain, palpitations and leg swelling.   Gastrointestinal:  Negative for abdominal pain, constipation, diarrhea, nausea and vomiting.   Endocrine: Negative for cold intolerance and heat intolerance.   Genitourinary:  Negative for flank pain, frequency and urgency.   Musculoskeletal:  Negative for back pain, gait problem and neck pain.   Skin:  Negative for rash and wound.   Neurological:  Positive for headaches. Negative for dizziness, tremors, syncope and numbness.   Hematological:  Does not bruise/bleed easily.   Psychiatric/Behavioral:  Negative for confusion, sleep disturbance and suicidal ideas.        Past Medical History:   Diagnosis Date    Complete tear of left rotator cuff 07/27/2017    Contusion, laceration, and hemorrhage of brainstem with loss of consciousness status unknown, initial encounter (Multi)     Brain stem injury    COPD (chronic obstructive pulmonary disease) (Multi)     S/P arthroscopy of shoulder 07/27/2017    Sprain of left shoulder 06/09/2017    Syringomyelia and syringobulbia (Multi) 06/23/2017    Syringomyelia and syringobulbia    Ulnar neuropathy of both upper extremities 06/02/2017    Unspecified injury of shoulder and upper arm, unspecified arm, initial encounter     Shoulder injury       Past Surgical History:   Procedure Laterality Date    CARPAL TUNNEL RELEASE  02/01/2017    Neuroplasty Median Nerve At Carpal Tunnel    INJECTION  N/A 08/30/2024    L5-S1 WIN    IR INJECTION EPIDURAL STEROID Bilateral 11/17/2023    Bilat L5/S1 TFESI    KNEE ARTHROSCOPY W/ DEBRIDEMENT  02/01/2017    Arthroscopy Knee    LUMBAR EPIDURAL INJECTION Bilateral 06/07/2024    L5-S1    OTHER SURGICAL HISTORY  02/01/2017    Nerve Block Transforaminal Epidural Lumbar L5 - S1    OTHER SURGICAL HISTORY  02/01/2017    History Of Prior Surgery    OTHER SURGICAL HISTORY  10/14/2022    Epidural steroid injection    OTHER SURGICAL HISTORY  10/14/2022    Epidural steroid injection    OTHER SURGICAL HISTORY  09/04/2020    Epidural steroid injection    OTHER SURGICAL HISTORY  01/22/2021    Epidural steroid injection    OTHER SURGICAL HISTORY  01/21/2022    Epidural steroid injection    OTHER SURGICAL HISTORY  05/22/2020    Epidural steroid injection    SHOULDER SURGERY  09/13/2017    Shoulder Surgery       Family History   Problem Relation Name Age of Onset    Alcohol abuse Mother      Lung cancer Father      Cancer Father          urinary bladder       Social History     Tobacco Use    Smoking status: Every Day     Current packs/day: 0.50     Average packs/day: 0.8 packs/day for 66.0 years (50.5 ttl pk-yrs)     Types: Cigarettes     Start date: 1994     Passive exposure: Current    Smokeless tobacco: Current     Types: Snuff   Vaping Use    Vaping status: Never Used   Substance Use Topics    Alcohol use: Yes     Alcohol/week: 6.0 standard drinks of alcohol     Types: 6 Cans of beer per week    Drug use: Never       No Known Allergies    Current Outpatient Medications   Medication Sig Dispense Refill    albuterol 90 mcg/actuation inhaler Inhale 2 puffs every 6 hours if needed for wheezing. 18 g 11    atorvastatin (Lipitor) 10 mg tablet Take 1 tablet (10 mg) by mouth once daily. 90 tablet 3    lisinopril 20 mg tablet Take 1 tablet (20 mg) by mouth once daily. 90 tablet 3    pregabalin (Lyrica) 50 mg capsule Take 2 capsules (100 mg) by mouth 2 times a day. 120 capsule 1    tamsulosin  "(Flomax) 0.4 mg 24 hr capsule Take 1 capsule (0.4 mg) by mouth once daily. 90 capsule 3     Current Facility-Administered Medications   Medication Dose Route Frequency Provider Last Rate Last Admin    BUPivacaine HCl (Marcaine) 0.25 % (2.5 mg/mL) injection 7.5 mg  3 mL injection Once Catherine Harrell PA-C        triamcinolone acetonide (KENALOG) injection kit 20 mg  20 mg intra-articular Once Catherine Harrell PA-C           Objective   Ht 1.803 m (5' 11\")   Wt 87.1 kg (192 lb)   BMI 26.78 kg/m²     Physical Exam  Vitals reviewed.   Constitutional:       Appearance: Normal appearance. He is normal weight.   HENT:      Head: Normocephalic.   Eyes:      Conjunctiva/sclera: Conjunctivae normal.   Musculoskeletal:         General: Normal range of motion.   Neurological:      General: No focal deficit present.      Mental Status: He is alert and oriented to person, place, and time.   Psychiatric:         Mood and Affect: Mood normal.         Behavior: Behavior normal.       Testing     Aug CMP - glucose 115    Impression    MDM    1) COMPLEXITY: 1 UNDIAGNOSED NEW PROBLEM WITH UNCERTAIN PROGNOSIS  2)DATA: TESTS INTERPRETED AND OR ORDERED, TOOK INDEPENDENT HISTORY OR RECORDS REVIEWED  3)RISK: MODERATE RISK DUE TO NATURE OF MEDICAL CONDITIONS/COMORBIDITY OR MEDICATIONS ORDERED OR SURGICAL OR PROCEDURE REFERRAL, .       Reviewed labs and Testing on file   Patient to follow diet low in cholesterol, fat, and sodium.    Patient is advised to increase Exercise.  Patient is recommended to lose weight.  Reviewed Meds and discussed common side effects  Continue as directed   Patient is strongly advised to be compliant with recommendations.    Return to Clinic sooner if needed.  Patient denies further questions/concerns at this time     Suspect more sinus infection and given the duration likely bacterial cause - suggest Abx   Steroid to start in a few days if pressure is not relieved or if cough worsens   Consider follow up I 1 " week illness check - pt states will call if needed     Assessment/Plan   Problem List Items Addressed This Visit             ICD-10-CM    Primary hypertension I10    Chronic obstructive pulmonary disease (Multi) J44.9    IFG (impaired fasting glucose) R73.01     Other Visit Diagnoses         Codes    Acute non-recurrent frontal sinusitis    -  Primary J01.10    Relevant Medications    azithromycin (Zithromax) 250 mg tablet    methylPREDNISolone (Medrol Dospak) 4 mg tablets             FU as before

## 2025-02-03 ENCOUNTER — OFFICE VISIT (OUTPATIENT)
Dept: PRIMARY CARE | Facility: CLINIC | Age: 60
End: 2025-02-03
Payer: MEDICARE

## 2025-02-03 VITALS
BODY MASS INDEX: 27.3 KG/M2 | WEIGHT: 195 LBS | HEIGHT: 71 IN | HEART RATE: 71 BPM | DIASTOLIC BLOOD PRESSURE: 73 MMHG | SYSTOLIC BLOOD PRESSURE: 143 MMHG

## 2025-02-03 DIAGNOSIS — J01.00 ACUTE NON-RECURRENT MAXILLARY SINUSITIS: Primary | ICD-10-CM

## 2025-02-03 PROCEDURE — 3078F DIAST BP <80 MM HG: CPT | Performed by: INTERNAL MEDICINE

## 2025-02-03 PROCEDURE — 99213 OFFICE O/P EST LOW 20 MIN: CPT | Performed by: INTERNAL MEDICINE

## 2025-02-03 PROCEDURE — 3077F SYST BP >= 140 MM HG: CPT | Performed by: INTERNAL MEDICINE

## 2025-02-03 PROCEDURE — 3008F BODY MASS INDEX DOCD: CPT | Performed by: INTERNAL MEDICINE

## 2025-02-03 RX ORDER — LEVOFLOXACIN 500 MG/1
500 TABLET, FILM COATED ORAL DAILY
Qty: 10 TABLET | Refills: 0 | Status: SHIPPED | OUTPATIENT
Start: 2025-02-03 | End: 2025-02-13

## 2025-02-03 ASSESSMENT — ENCOUNTER SYMPTOMS
CARDIOVASCULAR NEGATIVE: 1
RHINORRHEA: 1
ENDOCRINE NEGATIVE: 1
MUSCULOSKELETAL NEGATIVE: 1
ADENOPATHY: 0
NAUSEA: 0
NUMBNESS: 0
WHEEZING: 0
CONFUSION: 0
SINUS PRESSURE: 1
GASTROINTESTINAL NEGATIVE: 1
RESPIRATORY NEGATIVE: 1
CONSTIPATION: 0
EYE DISCHARGE: 0
COUGH: 0
EYES NEGATIVE: 1
ALLERGIC/IMMUNOLOGIC NEGATIVE: 1
HEMATOLOGIC/LYMPHATIC NEGATIVE: 1
LIGHT-HEADEDNESS: 0
SORE THROAT: 0
CONSTITUTIONAL NEGATIVE: 1
NEUROLOGICAL NEGATIVE: 1
SINUS PAIN: 1
HEADACHES: 0
AGITATION: 0
PALPITATIONS: 0
CHILLS: 0
VOMITING: 0
PSYCHIATRIC NEGATIVE: 1
ABDOMINAL PAIN: 0
NECK STIFFNESS: 0
BACK PAIN: 0
SHORTNESS OF BREATH: 0
DYSURIA: 0
JOINT SWELLING: 0
FEVER: 0
DIARRHEA: 0
ABDOMINAL DISTENTION: 0

## 2025-02-03 ASSESSMENT — PATIENT HEALTH QUESTIONNAIRE - PHQ9
1. LITTLE INTEREST OR PLEASURE IN DOING THINGS: NOT AT ALL
2. FEELING DOWN, DEPRESSED OR HOPELESS: NOT AT ALL
SUM OF ALL RESPONSES TO PHQ9 QUESTIONS 1 AND 2: 0

## 2025-02-03 NOTE — PROGRESS NOTES
Subjective   Patient ID: Luis Felipe Moreno is a 60 y.o. male who presents for Follow-up (Pt seen a few weeks back for sinus congestion and no after finishing z pac symptoms returned).  Co sinus pressure and congestion x 1.5 week  PND with cough at HS        Review of Systems   Constitutional: Negative.  Negative for chills and fever.   HENT:  Positive for postnasal drip, rhinorrhea, sinus pressure and sinus pain. Negative for congestion and sore throat.    Eyes: Negative.  Negative for discharge.   Respiratory: Negative.  Negative for cough, shortness of breath and wheezing.    Cardiovascular: Negative.  Negative for chest pain, palpitations and leg swelling.   Gastrointestinal: Negative.  Negative for abdominal distention, abdominal pain, constipation, diarrhea, nausea and vomiting.   Endocrine: Negative.    Genitourinary: Negative.  Negative for dysuria and urgency.   Musculoskeletal: Negative.  Negative for back pain, joint swelling and neck stiffness.   Skin: Negative.  Negative for rash.   Allergic/Immunologic: Negative.  Negative for immunocompromised state.   Neurological: Negative.  Negative for light-headedness, numbness and headaches.   Hematological: Negative.  Negative for adenopathy.   Psychiatric/Behavioral: Negative.  Negative for agitation, behavioral problems and confusion.    All other systems reviewed and are negative.      Objective   Physical Exam  Vitals reviewed.   Constitutional:       General: He is not in acute distress.     Appearance: Normal appearance.   HENT:      Head: Normocephalic and atraumatic.      Nose: Congestion and rhinorrhea present.      Comments: Tenderness max sinuses  Eyes:      Conjunctiva/sclera: Conjunctivae normal.      Pupils: Pupils are equal, round, and reactive to light.   Neck:      Vascular: No carotid bruit.   Cardiovascular:      Rate and Rhythm: Normal rate and regular rhythm.      Pulses: Normal pulses.      Heart sounds:      No gallop.   Pulmonary:       "Effort: Pulmonary effort is normal. No respiratory distress.      Breath sounds: Wheezing (mild) present.   Abdominal:      General: Bowel sounds are normal.      Palpations: Abdomen is soft.      Tenderness: There is no abdominal tenderness.   Musculoskeletal:         General: Normal range of motion.      Cervical back: Normal range of motion. No rigidity.   Lymphadenopathy:      Cervical: No cervical adenopathy.   Skin:     General: Skin is warm.      Findings: No rash.   Neurological:      General: No focal deficit present.      Mental Status: He is alert and oriented to person, place, and time.   Psychiatric:         Mood and Affect: Mood normal.         Behavior: Behavior normal.       /73 (BP Location: Right arm, Patient Position: Sitting)   Pulse 71   Ht 1.803 m (5' 11\")   Wt 88.5 kg (195 lb)   BMI 27.20 kg/m²    No results found for: \"CBCDIF\", \"CMPLAS\", \"PSA\", \"LASA\", \"HGBA1C\"  Assessment/Plan   Problem List Items Addressed This Visit    None  Visit Diagnoses       Acute non-recurrent maxillary sinusitis    -  Primary    Relevant Medications    levoFLOXacin (Levaquin) 500 mg tablet             PT. WAS INSTRUCTED TO INCREASE FLUID INTAKE ,TAKE TYLENOL 650 MG PO Q6H/PRN FOR PAIN OR FEVER AND TAKE ROBITUSSIN OTC 2 TSP Q 6H/PRN FOR COUGH.    "

## 2025-02-28 DIAGNOSIS — J44.9 CHRONIC OBSTRUCTIVE PULMONARY DISEASE, UNSPECIFIED COPD TYPE (MULTI): ICD-10-CM

## 2025-02-28 RX ORDER — ALBUTEROL SULFATE 90 UG/1
2 INHALANT RESPIRATORY (INHALATION) EVERY 6 HOURS PRN
Qty: 8.5 G | Refills: 11 | Status: SHIPPED | OUTPATIENT
Start: 2025-02-28 | End: 2026-02-28

## 2025-03-05 ENCOUNTER — TELEPHONE (OUTPATIENT)
Dept: PRIMARY CARE | Facility: CLINIC | Age: 60
End: 2025-03-05
Payer: MEDICARE

## 2025-03-05 DIAGNOSIS — J01.00 ACUTE NON-RECURRENT MAXILLARY SINUSITIS: Primary | ICD-10-CM

## 2025-03-05 RX ORDER — AMOXICILLIN AND CLAVULANATE POTASSIUM 875; 125 MG/1; MG/1
875 TABLET, FILM COATED ORAL 2 TIMES DAILY
Qty: 14 TABLET | Refills: 0 | Status: SHIPPED | OUTPATIENT
Start: 2025-03-05 | End: 2025-03-12

## 2025-03-05 NOTE — TELEPHONE ENCOUNTER
SAW PATIENT 2/3/25 FOR SINUSES.  TOLD TO CALL IF NOT BETTER AND HE ISN'T.  PLEASE ADVISE    [facility].

## 2025-04-03 ENCOUNTER — APPOINTMENT (OUTPATIENT)
Dept: PRIMARY CARE | Facility: CLINIC | Age: 60
End: 2025-04-03
Payer: MEDICARE

## 2025-04-03 VITALS
HEIGHT: 71 IN | HEART RATE: 65 BPM | WEIGHT: 199 LBS | BODY MASS INDEX: 27.86 KG/M2 | SYSTOLIC BLOOD PRESSURE: 152 MMHG | DIASTOLIC BLOOD PRESSURE: 79 MMHG

## 2025-04-03 DIAGNOSIS — Z12.5 SPECIAL SCREENING FOR MALIGNANT NEOPLASM OF PROSTATE: ICD-10-CM

## 2025-04-03 DIAGNOSIS — R73.01 IFG (IMPAIRED FASTING GLUCOSE): ICD-10-CM

## 2025-04-03 DIAGNOSIS — I10 PRIMARY HYPERTENSION: ICD-10-CM

## 2025-04-03 DIAGNOSIS — Z00.00 ROUTINE GENERAL MEDICAL EXAMINATION AT HEALTH CARE FACILITY: Primary | ICD-10-CM

## 2025-04-03 DIAGNOSIS — J44.9 CHRONIC OBSTRUCTIVE PULMONARY DISEASE, UNSPECIFIED COPD TYPE (MULTI): ICD-10-CM

## 2025-04-03 DIAGNOSIS — E78.00 HYPERCHOLESTEROLEMIA: ICD-10-CM

## 2025-04-03 PROCEDURE — 3008F BODY MASS INDEX DOCD: CPT | Performed by: INTERNAL MEDICINE

## 2025-04-03 PROCEDURE — 4004F PT TOBACCO SCREEN RCVD TLK: CPT | Performed by: INTERNAL MEDICINE

## 2025-04-03 PROCEDURE — G0439 PPPS, SUBSEQ VISIT: HCPCS | Performed by: INTERNAL MEDICINE

## 2025-04-03 PROCEDURE — 3077F SYST BP >= 140 MM HG: CPT | Performed by: INTERNAL MEDICINE

## 2025-04-03 PROCEDURE — 3078F DIAST BP <80 MM HG: CPT | Performed by: INTERNAL MEDICINE

## 2025-04-03 PROCEDURE — 99214 OFFICE O/P EST MOD 30 MIN: CPT | Performed by: INTERNAL MEDICINE

## 2025-04-03 RX ORDER — ALBUTEROL SULFATE 90 UG/1
2 INHALANT RESPIRATORY (INHALATION) EVERY 6 HOURS PRN
Qty: 8.5 G | Refills: 11 | Status: SHIPPED | OUTPATIENT
Start: 2025-04-03 | End: 2026-04-03

## 2025-04-03 RX ORDER — LISINOPRIL 30 MG/1
30 TABLET ORAL DAILY
Qty: 90 TABLET | Refills: 3 | Status: SHIPPED | OUTPATIENT
Start: 2025-04-03 | End: 2026-04-03

## 2025-04-03 RX ORDER — UMECLIDINIUM BROMIDE AND VILANTEROL TRIFENATATE 62.5; 25 UG/1; UG/1
1 POWDER RESPIRATORY (INHALATION) DAILY
Qty: 1 EACH | Refills: 11 | Status: SHIPPED | OUTPATIENT
Start: 2025-04-03

## 2025-04-03 RX ORDER — PNEUMOCOCCAL 20-VALENT CONJUGATE VACCINE 2.2; 2.2; 2.2; 2.2; 2.2; 2.2; 2.2; 2.2; 2.2; 2.2; 2.2; 2.2; 2.2; 2.2; 2.2; 2.2; 4.4; 2.2; 2.2; 2.2 UG/.5ML; UG/.5ML; UG/.5ML; UG/.5ML; UG/.5ML; UG/.5ML; UG/.5ML; UG/.5ML; UG/.5ML; UG/.5ML; UG/.5ML; UG/.5ML; UG/.5ML; UG/.5ML; UG/.5ML; UG/.5ML; UG/.5ML; UG/.5ML; UG/.5ML; UG/.5ML
0.5 INJECTION, SUSPENSION INTRAMUSCULAR ONCE
Qty: 0.5 ML | Refills: 0 | Status: SHIPPED | OUTPATIENT
Start: 2025-04-03 | End: 2025-04-03

## 2025-04-03 ASSESSMENT — ENCOUNTER SYMPTOMS
ENDOCRINE NEGATIVE: 1
CHILLS: 0
NAUSEA: 0
DYSURIA: 0
ADENOPATHY: 0
GASTROINTESTINAL NEGATIVE: 1
NEUROLOGICAL NEGATIVE: 1
LIGHT-HEADEDNESS: 0
COUGH: 0
PSYCHIATRIC NEGATIVE: 1
MUSCULOSKELETAL NEGATIVE: 1
NUMBNESS: 0
ALLERGIC/IMMUNOLOGIC NEGATIVE: 1
WHEEZING: 0
ABDOMINAL DISTENTION: 0
CONFUSION: 0
VOMITING: 0
SHORTNESS OF BREATH: 0
NECK STIFFNESS: 0
BACK PAIN: 0
CONSTIPATION: 0
HEADACHES: 0
EYE DISCHARGE: 0
ABDOMINAL PAIN: 0
RESPIRATORY NEGATIVE: 1
PALPITATIONS: 0
CONSTITUTIONAL NEGATIVE: 1
HEMATOLOGIC/LYMPHATIC NEGATIVE: 1
DIARRHEA: 0
EYES NEGATIVE: 1
AGITATION: 0
JOINT SWELLING: 0
CARDIOVASCULAR NEGATIVE: 1
FEVER: 0

## 2025-04-03 ASSESSMENT — ACTIVITIES OF DAILY LIVING (ADL)
BATHING: INDEPENDENT
DRESSING: INDEPENDENT
DOING_HOUSEWORK: INDEPENDENT
TAKING_MEDICATION: INDEPENDENT
GROCERY_SHOPPING: INDEPENDENT
MANAGING_FINANCES: INDEPENDENT

## 2025-04-03 NOTE — ASSESSMENT & PLAN NOTE
Orders:    CBC and Auto Differential; Future    Comprehensive Metabolic Panel; Future    lisinopril 30 mg tablet; Take 1 tablet (30 mg) by mouth once daily.

## 2025-04-03 NOTE — PROGRESS NOTES
"Subjective   Reason for Visit: Luis Felipe Moreno is an 60 y.o. male here for a Medicare Wellness visit.     Past Medical, Surgical, and Family History reviewed and updated in chart.    Reviewed all medications by prescribing practitioner or clinical pharmacist (such as prescriptions, OTCs, herbal therapies and supplements) and documented in the medical record.    Here for fu  Med refill  Wellness exam        Patient Care Team:  Alessandro Martin MD as PCP - General (Internal Medicine)  Alessandro Martin MD as PCP - [employer] PCP     Review of Systems   Constitutional: Negative.  Negative for chills and fever.   HENT: Negative.  Negative for congestion.    Eyes: Negative.  Negative for discharge.   Respiratory: Negative.  Negative for cough, shortness of breath and wheezing.    Cardiovascular: Negative.  Negative for chest pain, palpitations and leg swelling.   Gastrointestinal: Negative.  Negative for abdominal distention, abdominal pain, constipation, diarrhea, nausea and vomiting.   Endocrine: Negative.    Genitourinary: Negative.  Negative for dysuria and urgency.   Musculoskeletal: Negative.  Negative for back pain, joint swelling and neck stiffness.   Skin: Negative.  Negative for rash.   Allergic/Immunologic: Negative.  Negative for immunocompromised state.   Neurological: Negative.  Negative for light-headedness, numbness and headaches.   Hematological: Negative.  Negative for adenopathy.   Psychiatric/Behavioral: Negative.  Negative for agitation, behavioral problems and confusion.    All other systems reviewed and are negative.      Objective   Vitals:  /79 (BP Location: Left arm, Patient Position: Sitting)   Pulse 65   Ht 1.803 m (5' 11\")   Wt 90.3 kg (199 lb)   BMI 27.75 kg/m²       Physical Exam  Vitals reviewed.   Constitutional:       General: He is not in acute distress.     Appearance: Normal appearance.   HENT:      Head: Normocephalic and atraumatic.      Nose: Nose " normal.   Eyes:      Conjunctiva/sclera: Conjunctivae normal.      Pupils: Pupils are equal, round, and reactive to light.   Neck:      Vascular: No carotid bruit.   Cardiovascular:      Rate and Rhythm: Normal rate and regular rhythm.      Pulses: Normal pulses.      Heart sounds:      No gallop.   Pulmonary:      Effort: No respiratory distress.      Breath sounds: Wheezing (mild) present.   Abdominal:      General: Bowel sounds are normal.      Palpations: Abdomen is soft.      Tenderness: There is no abdominal tenderness.   Musculoskeletal:         General: Normal range of motion.      Cervical back: Normal range of motion. No rigidity.   Lymphadenopathy:      Cervical: No cervical adenopathy.   Skin:     General: Skin is warm.      Findings: No rash.   Neurological:      General: No focal deficit present.      Mental Status: He is alert and oriented to person, place, and time.   Psychiatric:         Mood and Affect: Mood normal.         Behavior: Behavior normal.         Assessment & Plan  Chronic obstructive pulmonary disease, unspecified COPD type (Multi)    Orders:    albuterol 90 mcg/actuation inhaler; Inhale 2 puffs every 6 hours if needed for wheezing.    CBC and Auto Differential; Future    Comprehensive Metabolic Panel; Future    umeclidinium-vilanteroL (Anoro Ellipta) 62.5-25 mcg/actuation blister with inhaler device; Inhale 1 puff once daily.    pneumoc 20-alexus conj-dip cr,PF, (Prevnar 20, PF,) 0.5 mL vaccine; Inject 0.5 mL into the muscle 1 time for 1 dose.    Routine general medical examination at health care facility    Orders:    1 Year Follow Up In Primary Care - Wellness Exam; Future    CBC and Auto Differential; Future    Comprehensive Metabolic Panel; Future    pneumoc 20-alexus conj-dip cr,PF, (Prevnar 20, PF,) 0.5 mL vaccine; Inject 0.5 mL into the muscle 1 time for 1 dose.    Primary hypertension    Orders:    CBC and Auto Differential; Future    Comprehensive Metabolic Panel; Future     lisinopril 30 mg tablet; Take 1 tablet (30 mg) by mouth once daily.    IFG (impaired fasting glucose)    Orders:    CBC and Auto Differential; Future    Comprehensive Metabolic Panel; Future    Hemoglobin A1C; Future    Hypercholesterolemia    Orders:    Lipid Panel; Future    Special screening for malignant neoplasm of prostate    Orders:    Prostate Specific Antigen, Screen; Future         Advanced Care Planing discussed, diagnosis , treatment and prognosis discussed with pt,pt has capacity to make own decision, pt has a living will, to bring a copy for the chart.        HTN addressed as follow:    MONITOR BP   GOAL BP LOWER THAN 130/80  LOW SALT  EXERCISE DAILY    I have counselled patient about need for smoking/tobacco cessation and how I can support efforts when patient is ready to quit.  Discussed nicotine replacement therapy, Varenicline, Bupropion, hypnosis, support groups, and acupuncture as potential options.  I spend 4 minutes counseling.    Fu 4 mo bw

## 2025-04-03 NOTE — ASSESSMENT & PLAN NOTE
Orders:    albuterol 90 mcg/actuation inhaler; Inhale 2 puffs every 6 hours if needed for wheezing.    CBC and Auto Differential; Future    Comprehensive Metabolic Panel; Future    umeclidinium-vilanteroL (Anoro Ellipta) 62.5-25 mcg/actuation blister with inhaler device; Inhale 1 puff once daily.    pneumoc 20-alexus conj-dip cr,PF, (Prevnar 20, PF,) 0.5 mL vaccine; Inject 0.5 mL into the muscle 1 time for 1 dose.

## 2025-06-30 ENCOUNTER — OFFICE VISIT (OUTPATIENT)
Dept: PAIN MEDICINE | Facility: CLINIC | Age: 60
End: 2025-06-30
Payer: MEDICARE

## 2025-06-30 VITALS
HEART RATE: 67 BPM | HEIGHT: 71 IN | BODY MASS INDEX: 26.46 KG/M2 | DIASTOLIC BLOOD PRESSURE: 75 MMHG | WEIGHT: 189 LBS | RESPIRATION RATE: 20 BRPM | SYSTOLIC BLOOD PRESSURE: 146 MMHG

## 2025-06-30 DIAGNOSIS — M54.17 LUMBOSACRAL RADICULOPATHY: Primary | ICD-10-CM

## 2025-06-30 DIAGNOSIS — M48.062 NEUROGENIC CLAUDICATION DUE TO LUMBAR SPINAL STENOSIS: ICD-10-CM

## 2025-06-30 PROCEDURE — 99213 OFFICE O/P EST LOW 20 MIN: CPT

## 2025-06-30 RX ORDER — LIDOCAINE HYDROCHLORIDE 20 MG/ML
6 INJECTION, SOLUTION EPIDURAL; INFILTRATION; INTRACAUDAL; PERINEURAL ONCE
OUTPATIENT
Start: 2025-06-30 | End: 2025-06-30

## 2025-06-30 RX ORDER — SODIUM CHLORIDE 9 MG/ML
4 INJECTION, SOLUTION INTRAMUSCULAR; INTRAVENOUS; SUBCUTANEOUS ONCE
OUTPATIENT
Start: 2025-06-30 | End: 2025-06-30

## 2025-06-30 RX ORDER — METHYLPREDNISOLONE ACETATE 40 MG/ML
40 INJECTION, SUSPENSION INTRA-ARTICULAR; INTRALESIONAL; INTRAMUSCULAR; SOFT TISSUE ONCE
OUTPATIENT
Start: 2025-06-30 | End: 2025-06-30

## 2025-06-30 ASSESSMENT — ENCOUNTER SYMPTOMS
CONSTITUTIONAL NEGATIVE: 1
ADENOPATHY: 0
WHEEZING: 0
EYES NEGATIVE: 1
FACIAL ASYMMETRY: 0
COUGH: 0
PALPITATIONS: 0
WEAKNESS: 0
MYALGIAS: 1
ALLERGIC/IMMUNOLOGIC NEGATIVE: 1
DYSPHORIC MOOD: 0
ARTHRALGIAS: 0
BRUISES/BLEEDS EASILY: 0
BACK PAIN: 1
LIGHT-HEADEDNESS: 0
ENDOCRINE NEGATIVE: 1
SHORTNESS OF BREATH: 0

## 2025-06-30 ASSESSMENT — PAIN SCALES - GENERAL: PAINLEVEL_OUTOF10: 7

## 2025-06-30 NOTE — H&P (VIEW-ONLY)
Subjective   Patient ID: Luis Felipe Moreno is a 60 y.o. male who presents for Back Pain (Patient is returning today for increased pain.  Patient rates his pain a 7/10.  Patient states his pain returned 2 weeks ago bending over.  Patient states his pain is in his right lower back radiating into his right hip and down his right leg to his ankle.  Patient states his right lower leg and foot has numbness.  ).  Patient states his pain intensity varies.  Sometimes it lets up and sometimes it's very severe.  He states any activity increases his pain.  Patient stretches at home and uses advil.      AMIRA score 54.  Depression screen completed, negative.  Falls screen NA due to patient age.  Smoking screen completed, positive.  Education handout about smoking and pain provided.  MMA updated today.  Maria Eugenia Hassan RN 06/30/25 9:08 AM     The patient is a 60-year-old male who presents today after nearly a 10-month hiatus.  He is coming in with increasing lower back pain.  He says the pain is always constant, but that 2 weeks ago he was bending over doing physical labor and had sharp increase with pain across his lower back.  Since then it has been radiating down the lateral aspect of his right leg primarily down to his ankle, but into the lateral aspect of his left thigh as well.  He currently rates pain 7/10.  He also has numbness radiating into his legs.  He last had an L5-S1 WIN on 8/30/2025 that provided greater than 50% relief lasting for nearly 10 months before the pain worsened to where it is currently.  He had stopped taking his pregabalin 100 mg twice a day, but when the pain increased he resumed taking this.  This does provide some relief.  However the pain still interferes with his ability to function and perform ADLs.  He is wanting to repeat the injection at this time.        Review of Systems   Constitutional: Negative.    HENT: Negative.     Eyes: Negative.    Respiratory:  Negative for cough, shortness of breath  and wheezing.    Cardiovascular:  Negative for chest pain, palpitations and leg swelling.   Endocrine: Negative.    Genitourinary: Negative.    Musculoskeletal:  Positive for back pain and myalgias. Negative for arthralgias.   Skin: Negative.    Allergic/Immunologic: Negative.    Neurological:  Negative for facial asymmetry, weakness and light-headedness.   Hematological:  Negative for adenopathy. Does not bruise/bleed easily.   Psychiatric/Behavioral:  Negative for dysphoric mood and suicidal ideas.        Objective   Physical Exam  Constitutional:       General: He is not in acute distress.     Appearance: Normal appearance.   HENT:      Head: Normocephalic.      Mouth/Throat:      Mouth: Mucous membranes are moist.   Eyes:      Extraocular Movements: Extraocular movements intact.   Cardiovascular:      Rate and Rhythm: Normal rate and regular rhythm.      Pulses: Normal pulses.      Heart sounds: Normal heart sounds. No murmur heard.     No friction rub. No gallop.   Pulmonary:      Effort: Pulmonary effort is normal.      Breath sounds: Normal breath sounds. No wheezing, rhonchi or rales.   Abdominal:      General: Abdomen is flat.      Palpations: Abdomen is soft.   Musculoskeletal:      Cervical back: Normal range of motion.      Right lower leg: No edema.      Left lower leg: No edema.      Comments: Ambulates without assistance  Strength 5/5 BLE  SLR positive right, negative left   Lymphadenopathy:      Cervical: No cervical adenopathy.   Skin:     General: Skin is warm and dry.   Neurological:      General: No focal deficit present.      Mental Status: He is alert and oriented to person, place, and time. Mental status is at baseline.   Psychiatric:         Mood and Affect: Mood normal.         Behavior: Behavior normal.         Assessment/Plan   Diagnoses and all orders for this visit:  Lumbosacral radiculopathy  -     FL pain management; Future  -     Epidural Steroid Injection; Future  Neurogenic  claudication due to lumbar spinal stenosis  Other orders  -     iohexol (OMNIPaque) 300 mg iodine/mL solution 3 mL  -     lidocaine PF (Xylocaine) 20 mg/mL (2 %) injection 120 mg  -     sodium chloride (PF) 0.9% solution 4 mL  -     methylPREDNISolone acetate (DEPO-Medrol) injection 40 mg  -     NPO Diet Except: Sips with meds; Effective now; Standing  -     Height and weight; Standing  -     Adult diet Regular; Standing  -     Vital Signs; Standing  -     Neuro checks; Standing  -     Notify provider (specify parameters); Standing  -     Prior to Discharge O2 Weaning; Standing  -     Pulse oximetry, continuous; Standing  -     Discharge patient; Standing       The patient is a 60-year-old male with past medical history significant for the above-mentioned problems.  We reviewed his MRI imaging and based on findings, his pain pattern, his physical exam, I recommend pursuing an L4-5 epidural steroid injection under fluoroscopy.  The procedure was discussed, risks and benefits were discussed, patient is agreeable.  Advised include ibuprofen for 1 day.  He will follow-up after the injection for reevaluation, call the clinic sooner if needed.

## 2025-07-01 ENCOUNTER — TELEPHONE (OUTPATIENT)
Dept: PAIN MEDICINE | Facility: CLINIC | Age: 60
End: 2025-07-01
Payer: MEDICARE

## 2025-07-15 ENCOUNTER — HOSPITAL ENCOUNTER (OUTPATIENT)
Dept: OPERATING ROOM | Facility: HOSPITAL | Age: 60
Setting detail: OUTPATIENT SURGERY
Discharge: HOME | End: 2025-07-15
Payer: MEDICARE

## 2025-07-15 VITALS
BODY MASS INDEX: 26.63 KG/M2 | WEIGHT: 190.2 LBS | HEART RATE: 64 BPM | HEIGHT: 71 IN | TEMPERATURE: 98 F | DIASTOLIC BLOOD PRESSURE: 67 MMHG | SYSTOLIC BLOOD PRESSURE: 129 MMHG | RESPIRATION RATE: 20 BRPM | OXYGEN SATURATION: 96 %

## 2025-07-15 DIAGNOSIS — M54.17 LUMBOSACRAL RADICULOPATHY: ICD-10-CM

## 2025-07-15 PROCEDURE — 2500000004 HC RX 250 GENERAL PHARMACY W/ HCPCS (ALT 636 FOR OP/ED)

## 2025-07-15 PROCEDURE — 2500000004 HC RX 250 GENERAL PHARMACY W/ HCPCS (ALT 636 FOR OP/ED): Mod: JZ | Performed by: STUDENT IN AN ORGANIZED HEALTH CARE EDUCATION/TRAINING PROGRAM

## 2025-07-15 PROCEDURE — 2550000001 HC RX 255 CONTRASTS: Mod: JW

## 2025-07-15 PROCEDURE — 2500000005 HC RX 250 GENERAL PHARMACY W/O HCPCS: Performed by: STUDENT IN AN ORGANIZED HEALTH CARE EDUCATION/TRAINING PROGRAM

## 2025-07-15 PROCEDURE — 7100000009 HC PHASE TWO TIME - INITIAL BASE CHARGE

## 2025-07-15 PROCEDURE — 62323 NJX INTERLAMINAR LMBR/SAC: CPT | Performed by: STUDENT IN AN ORGANIZED HEALTH CARE EDUCATION/TRAINING PROGRAM

## 2025-07-15 PROCEDURE — 7100000010 HC PHASE TWO TIME - EACH INCREMENTAL 1 MINUTE

## 2025-07-15 RX ORDER — IBUPROFEN 200 MG
800 TABLET ORAL EVERY 8 HOURS PRN
COMMUNITY

## 2025-07-15 RX ORDER — LIDOCAINE HYDROCHLORIDE 20 MG/ML
6 INJECTION, SOLUTION EPIDURAL; INFILTRATION; INTRACAUDAL; PERINEURAL ONCE
Status: COMPLETED | OUTPATIENT
Start: 2025-07-15 | End: 2025-07-15

## 2025-07-15 RX ORDER — METHYLPREDNISOLONE ACETATE 40 MG/ML
40 INJECTION, SUSPENSION INTRA-ARTICULAR; INTRALESIONAL; INTRAMUSCULAR; SOFT TISSUE ONCE
Status: DISCONTINUED | OUTPATIENT
Start: 2025-07-15 | End: 2025-07-16 | Stop reason: HOSPADM

## 2025-07-15 RX ORDER — METHYLPREDNISOLONE ACETATE 40 MG/ML
INJECTION, SUSPENSION INTRA-ARTICULAR; INTRALESIONAL; INTRAMUSCULAR; SOFT TISSUE AS NEEDED
Status: COMPLETED | OUTPATIENT
Start: 2025-07-15 | End: 2025-07-15

## 2025-07-15 RX ORDER — SODIUM CHLORIDE 9 MG/ML
4 INJECTION, SOLUTION INTRAMUSCULAR; INTRAVENOUS; SUBCUTANEOUS ONCE
Status: DISCONTINUED | OUTPATIENT
Start: 2025-07-15 | End: 2025-07-16 | Stop reason: HOSPADM

## 2025-07-15 RX ORDER — SODIUM CHLORIDE 9 MG/ML
INJECTION, SOLUTION INTRAMUSCULAR; INTRAVENOUS; SUBCUTANEOUS AS NEEDED
Status: COMPLETED | OUTPATIENT
Start: 2025-07-15 | End: 2025-07-15

## 2025-07-15 RX ADMIN — IOHEXOL 1 ML: 300 INJECTION, SOLUTION INTRAVENOUS at 14:33

## 2025-07-15 RX ADMIN — METHYLPREDNISOLONE ACETATE 40 MG: 40 INJECTION, SUSPENSION INTRA-ARTICULAR; INTRALESIONAL; INTRAMUSCULAR; SOFT TISSUE at 14:33

## 2025-07-15 RX ADMIN — SODIUM CHLORIDE 4 ML: 9 INJECTION INTRAMUSCULAR; INTRAVENOUS; SUBCUTANEOUS at 14:33

## 2025-07-15 RX ADMIN — LIDOCAINE HYDROCHLORIDE 4 ML: 20 INJECTION, SOLUTION EPIDURAL; INFILTRATION; INTRACAUDAL; PERINEURAL at 14:33

## 2025-07-15 ASSESSMENT — PAIN SCALES - GENERAL
PAINLEVEL_OUTOF10: 4
PAINLEVEL_OUTOF10: 8

## 2025-07-15 ASSESSMENT — PAIN - FUNCTIONAL ASSESSMENT
PAIN_FUNCTIONAL_ASSESSMENT: 0-10
PAIN_FUNCTIONAL_ASSESSMENT: 0-10

## 2025-07-15 ASSESSMENT — PAIN DESCRIPTION - DESCRIPTORS: DESCRIPTORS: THROBBING;SHARP

## 2025-07-24 DIAGNOSIS — N40.1 BENIGN PROSTATIC HYPERPLASIA WITH LOWER URINARY TRACT SYMPTOMS, SYMPTOM DETAILS UNSPECIFIED: ICD-10-CM

## 2025-07-24 DIAGNOSIS — E78.00 HYPERCHOLESTEROLEMIA: ICD-10-CM

## 2025-07-24 RX ORDER — TAMSULOSIN HYDROCHLORIDE 0.4 MG/1
0.4 CAPSULE ORAL DAILY
Qty: 90 CAPSULE | Refills: 3 | Status: SHIPPED | OUTPATIENT
Start: 2025-07-24

## 2025-07-24 RX ORDER — ATORVASTATIN CALCIUM 10 MG/1
10 TABLET, FILM COATED ORAL DAILY
Qty: 90 TABLET | Refills: 3 | Status: SHIPPED | OUTPATIENT
Start: 2025-07-24

## 2025-08-07 ENCOUNTER — OFFICE VISIT (OUTPATIENT)
Dept: PAIN MEDICINE | Facility: CLINIC | Age: 60
End: 2025-08-07
Payer: MEDICARE

## 2025-08-07 ENCOUNTER — APPOINTMENT (OUTPATIENT)
Dept: PRIMARY CARE | Facility: CLINIC | Age: 60
End: 2025-08-07
Payer: MEDICARE

## 2025-08-07 VITALS — HEART RATE: 67 BPM | RESPIRATION RATE: 16 BRPM | SYSTOLIC BLOOD PRESSURE: 129 MMHG | DIASTOLIC BLOOD PRESSURE: 72 MMHG

## 2025-08-07 DIAGNOSIS — M19.012 ARTHROPATHY OF LEFT SHOULDER: ICD-10-CM

## 2025-08-07 DIAGNOSIS — M54.17 LUMBOSACRAL RADICULOPATHY: Primary | ICD-10-CM

## 2025-08-07 PROCEDURE — 99213 OFFICE O/P EST LOW 20 MIN: CPT

## 2025-08-07 RX ORDER — LIDOCAINE HYDROCHLORIDE 20 MG/ML
6 INJECTION, SOLUTION EPIDURAL; INFILTRATION; INTRACAUDAL; PERINEURAL ONCE
OUTPATIENT
Start: 2025-08-07 | End: 2025-08-07

## 2025-08-07 RX ORDER — METHYLPREDNISOLONE ACETATE 40 MG/ML
40 INJECTION, SUSPENSION INTRA-ARTICULAR; INTRALESIONAL; INTRAMUSCULAR; SOFT TISSUE ONCE
OUTPATIENT
Start: 2025-08-07 | End: 2025-08-07

## 2025-08-07 RX ORDER — SODIUM CHLORIDE 9 MG/ML
4 INJECTION, SOLUTION INTRAMUSCULAR; INTRAVENOUS; SUBCUTANEOUS ONCE
OUTPATIENT
Start: 2025-08-07 | End: 2025-08-07

## 2025-08-07 ASSESSMENT — ENCOUNTER SYMPTOMS
CONSTITUTIONAL NEGATIVE: 1
ADENOPATHY: 0
ALLERGIC/IMMUNOLOGIC NEGATIVE: 1
COUGH: 0
FACIAL ASYMMETRY: 0
WHEEZING: 0
BACK PAIN: 1
MYALGIAS: 1
WEAKNESS: 0
ARTHRALGIAS: 0
LIGHT-HEADEDNESS: 0
EYES NEGATIVE: 1
SHORTNESS OF BREATH: 0
DYSPHORIC MOOD: 0
BRUISES/BLEEDS EASILY: 0
ENDOCRINE NEGATIVE: 1
PALPITATIONS: 0

## 2025-08-07 ASSESSMENT — PATIENT HEALTH QUESTIONNAIRE - PHQ9
1. LITTLE INTEREST OR PLEASURE IN DOING THINGS: NOT AT ALL
SUM OF ALL RESPONSES TO PHQ9 QUESTIONS 1 AND 2: 0
2. FEELING DOWN, DEPRESSED OR HOPELESS: NOT AT ALL

## 2025-08-07 NOTE — PROGRESS NOTES
"Subjective   Patient ID: Luis Felipe Moreno is a 60 y.o. male who presents for Follow-up (FOLLOW UP L4-L5 WIN DONE ON 7/15/25 HE DIDN'T GET MUCH RELIEF FROM THE PROCEDURE, SOME OF THE PAIN TO THE RIGHT HIS HAS GONE AWAY, HE STILL HAS A \"SPEAR\" LIKE FEELING TO THE RIGHT CALF WITH EVERY STEP HE TAKES, HIS LOWER BACK IS A DEEP ACHE,TIGHT STIFF THAT IS CONSTANT, PAIN WITH TRANSITIONING, STANDING, WALKING, BENDING, REP MOTIONS ADL,).TRIES TO DO HOME  STRETCHING, TAKING LYRICA ADVIL, PAIN SCORE 7/10, AMIRA=54%, COMM=36  Sherri Pike, [facility] 08/07/25 1:38 PM     The patient is a 60-year-old male who presents today for follow-up after undergoing an L4-5 epidural steroid injection on 7/15/2025.  He reports mild relief from this procedure with anywhere from 10 to 30% relief ongoing.  He says while it did help take the edge off of the pain, it is nowhere near what the other shot he received last year did not help.  He is still having sharp pains in his right calf, and pain across his lower back.  He is interested in repeating the previous L5-S1 epidural steroid injection he had done last year, feeling this was more effective in helping with the pain.        Review of Systems   Constitutional: Negative.    HENT: Negative.     Eyes: Negative.    Respiratory:  Negative for cough, shortness of breath and wheezing.    Cardiovascular:  Negative for chest pain, palpitations and leg swelling.   Endocrine: Negative.    Genitourinary: Negative.    Musculoskeletal:  Positive for back pain and myalgias. Negative for arthralgias.   Skin: Negative.    Allergic/Immunologic: Negative.    Neurological:  Negative for facial asymmetry, weakness and light-headedness.   Hematological:  Negative for adenopathy. Does not bruise/bleed easily.   Psychiatric/Behavioral:  Negative for dysphoric mood and suicidal ideas.        Objective   Physical Exam  Constitutional:       General: He is not in acute distress.     Appearance: Normal appearance.   HENT:      " Head: Normocephalic.      Mouth/Throat:      Mouth: Mucous membranes are moist.     Eyes:      Extraocular Movements: Extraocular movements intact.       Cardiovascular:      Rate and Rhythm: Normal rate and regular rhythm.      Pulses: Normal pulses.      Heart sounds: Normal heart sounds. No murmur heard.     No friction rub. No gallop.   Pulmonary:      Effort: Pulmonary effort is normal.      Breath sounds: Normal breath sounds. No wheezing, rhonchi or rales.   Abdominal:      General: Abdomen is flat.      Palpations: Abdomen is soft.     Musculoskeletal:      Cervical back: Normal range of motion.      Right lower leg: No edema.      Left lower leg: No edema.      Comments: Ambulates without assistance  Strength 5/5 BLE  SLR positive right, negative left   Lymphadenopathy:      Cervical: No cervical adenopathy.     Skin:     General: Skin is warm and dry.     Neurological:      General: No focal deficit present.      Mental Status: He is alert and oriented to person, place, and time. Mental status is at baseline.     Psychiatric:         Mood and Affect: Mood normal.         Behavior: Behavior normal.         Assessment/Plan   Diagnoses and all orders for this visit:  Lumbosacral radiculopathy  -     FL pain management; Future  -     Epidural Steroid Injection; Future  Arthropathy of left shoulder  Other orders  -     iohexol (OMNIPaque) 300 mg iodine/mL solution 3 mL  -     lidocaine PF (Xylocaine) 20 mg/mL (2 %) injection 120 mg  -     sodium chloride (PF) 0.9% solution 4 mL  -     methylPREDNISolone acetate (DEPO-Medrol) injection 40 mg  -     NPO Diet Except: Sips with meds; Effective now; Standing  -     Height and weight; Standing  -     Adult diet Regular; Standing  -     Vital Signs; Standing  -     Neuro checks; Standing  -     Notify provider (specify parameters); Standing  -     Prior to Discharge O2 Weaning; Standing  -     Pulse oximetry, continuous; Standing  -     Discharge patient;  Standing       The patient is a 60-year-old male with a past medical history significant for the above-mentioned problems.  He is following up after an L4-5 WIN with up to 30% relief.  We reviewed his imaging and based on the findings, his pain pattern, his physical exam, I recommend pursuing an L5-S1 epidural steroid injection under fluoroscopy.  The procedure was discussed, risks and benefits were discussed, patient is agreeable.  Med holds include ibuprofen for 1 day.  He will follow-up after the injection for reevaluation, call the clinic sooner if needed.  For his left shoulder pain, he is interested in a repeat steroid injection in office, we will get this approved through his insurance and will bring him back into the office for this injection.

## 2025-08-08 ENCOUNTER — TELEPHONE (OUTPATIENT)
Dept: PAIN MEDICINE | Facility: CLINIC | Age: 60
End: 2025-08-08
Payer: MEDICARE

## 2025-08-08 NOTE — TELEPHONE ENCOUNTER
----- Message from Nurse Carmel HALL sent at 8/7/2025  2:14 PM EDT -----  Luis Felipe is schedule for a R shoulder injection on follow up

## 2025-08-09 ENCOUNTER — TELEPHONE (OUTPATIENT)
Dept: PAIN MEDICINE | Facility: CLINIC | Age: 60
End: 2025-08-09
Payer: MEDICARE

## 2025-08-09 LAB
ALBUMIN SERPL-MCNC: 4.6 G/DL (ref 3.6–5.1)
ALP SERPL-CCNC: 59 U/L (ref 35–144)
ALT SERPL-CCNC: 24 U/L (ref 9–46)
ANION GAP SERPL CALCULATED.4IONS-SCNC: 7 MMOL/L (CALC) (ref 7–17)
AST SERPL-CCNC: 21 U/L (ref 10–35)
BASOPHILS # BLD AUTO: 70 CELLS/UL (ref 0–200)
BASOPHILS NFR BLD AUTO: 0.8 %
BILIRUB SERPL-MCNC: 0.6 MG/DL (ref 0.2–1.2)
BUN SERPL-MCNC: 15 MG/DL (ref 7–25)
CALCIUM SERPL-MCNC: 9.9 MG/DL (ref 8.6–10.3)
CHLORIDE SERPL-SCNC: 100 MMOL/L (ref 98–110)
CHOLEST SERPL-MCNC: 132 MG/DL
CHOLEST/HDLC SERPL: 1.7 (CALC)
CO2 SERPL-SCNC: 25 MMOL/L (ref 20–32)
CREAT SERPL-MCNC: 0.81 MG/DL (ref 0.7–1.35)
EGFRCR SERPLBLD CKD-EPI 2021: 100 ML/MIN/1.73M2
EOSINOPHIL # BLD AUTO: 141 CELLS/UL (ref 15–500)
EOSINOPHIL NFR BLD AUTO: 1.6 %
ERYTHROCYTE [DISTWIDTH] IN BLOOD BY AUTOMATED COUNT: 11.7 % (ref 11–15)
EST. AVERAGE GLUCOSE BLD GHB EST-MCNC: 117 MG/DL
EST. AVERAGE GLUCOSE BLD GHB EST-SCNC: 6.5 MMOL/L
GLUCOSE SERPL-MCNC: 100 MG/DL (ref 65–99)
HBA1C MFR BLD: 5.7 %
HCT VFR BLD AUTO: 45.3 % (ref 38.5–50)
HDLC SERPL-MCNC: 80 MG/DL
HGB BLD-MCNC: 14.8 G/DL (ref 13.2–17.1)
LDLC SERPL CALC-MCNC: 35 MG/DL (CALC)
LYMPHOCYTES # BLD AUTO: 2517 CELLS/UL (ref 850–3900)
LYMPHOCYTES NFR BLD AUTO: 28.6 %
MCH RBC QN AUTO: 32.4 PG (ref 27–33)
MCHC RBC AUTO-ENTMCNC: 32.7 G/DL (ref 32–36)
MCV RBC AUTO: 99.1 FL (ref 80–100)
MONOCYTES # BLD AUTO: 722 CELLS/UL (ref 200–950)
MONOCYTES NFR BLD AUTO: 8.2 %
NEUTROPHILS # BLD AUTO: 5350 CELLS/UL (ref 1500–7800)
NEUTROPHILS NFR BLD AUTO: 60.8 %
NONHDLC SERPL-MCNC: 52 MG/DL (CALC)
PLATELET # BLD AUTO: 282 THOUSAND/UL (ref 140–400)
PMV BLD REES-ECKER: 8.8 FL (ref 7.5–12.5)
POTASSIUM SERPL-SCNC: 5.4 MMOL/L (ref 3.5–5.3)
PROT SERPL-MCNC: 6.8 G/DL (ref 6.1–8.1)
PSA SERPL-MCNC: 1.29 NG/ML
RBC # BLD AUTO: 4.57 MILLION/UL (ref 4.2–5.8)
SODIUM SERPL-SCNC: 132 MMOL/L (ref 135–146)
TRIGL SERPL-MCNC: 86 MG/DL
WBC # BLD AUTO: 8.8 THOUSAND/UL (ref 3.8–10.8)

## 2025-08-11 ENCOUNTER — APPOINTMENT (OUTPATIENT)
Dept: PRIMARY CARE | Facility: CLINIC | Age: 60
End: 2025-08-11
Payer: MEDICARE

## 2025-08-11 ENCOUNTER — TELEPHONE (OUTPATIENT)
Dept: PRIMARY CARE | Facility: CLINIC | Age: 60
End: 2025-08-11

## 2025-08-11 VITALS
WEIGHT: 185 LBS | DIASTOLIC BLOOD PRESSURE: 72 MMHG | BODY MASS INDEX: 26.48 KG/M2 | HEIGHT: 70 IN | HEART RATE: 62 BPM | SYSTOLIC BLOOD PRESSURE: 125 MMHG

## 2025-08-11 DIAGNOSIS — R55 SYNCOPE, UNSPECIFIED SYNCOPE TYPE: ICD-10-CM

## 2025-08-11 DIAGNOSIS — J44.9 CHRONIC OBSTRUCTIVE PULMONARY DISEASE, UNSPECIFIED COPD TYPE (MULTI): Primary | ICD-10-CM

## 2025-08-11 DIAGNOSIS — I10 PRIMARY HYPERTENSION: ICD-10-CM

## 2025-08-11 DIAGNOSIS — R07.9 CHEST PAIN, UNSPECIFIED TYPE: ICD-10-CM

## 2025-08-11 DIAGNOSIS — J30.9 ALLERGIC RHINITIS, UNSPECIFIED SEASONALITY, UNSPECIFIED TRIGGER: ICD-10-CM

## 2025-08-11 DIAGNOSIS — R73.01 IFG (IMPAIRED FASTING GLUCOSE): ICD-10-CM

## 2025-08-11 DIAGNOSIS — R06.09 DOE (DYSPNEA ON EXERTION): ICD-10-CM

## 2025-08-11 RX ORDER — FLUTICASONE PROPIONATE 50 MCG
1 SPRAY, SUSPENSION (ML) NASAL DAILY
Qty: 16 G | Refills: 11 | Status: SHIPPED | OUTPATIENT
Start: 2025-08-11 | End: 2026-08-11

## 2025-08-11 RX ORDER — FLUTICASONE PROPIONATE AND SALMETEROL 100; 50 UG/1; UG/1
1 POWDER RESPIRATORY (INHALATION)
Qty: 60 EACH | Refills: 11 | Status: SHIPPED | OUTPATIENT
Start: 2025-08-11 | End: 2026-08-11

## 2025-08-11 RX ORDER — ALBUTEROL SULFATE 90 UG/1
1 INHALANT RESPIRATORY (INHALATION) ONCE
OUTPATIENT
Start: 2025-08-11

## 2025-08-11 RX ORDER — ALBUTEROL SULFATE 0.83 MG/ML
3 SOLUTION RESPIRATORY (INHALATION) ONCE
OUTPATIENT
Start: 2025-08-11 | End: 2025-08-11

## 2025-08-11 ASSESSMENT — ENCOUNTER SYMPTOMS
HEADACHES: 0
NEUROLOGICAL NEGATIVE: 1
EYES NEGATIVE: 1
MUSCULOSKELETAL NEGATIVE: 1
SHORTNESS OF BREATH: 1
ABDOMINAL PAIN: 0
PSYCHIATRIC NEGATIVE: 1
FEVER: 0
LIGHT-HEADEDNESS: 0
DYSURIA: 0
EYE DISCHARGE: 0
GASTROINTESTINAL NEGATIVE: 1
PALPITATIONS: 0
ABDOMINAL DISTENTION: 0
FATIGUE: 1
COUGH: 0
CONSTIPATION: 0
ALLERGIC/IMMUNOLOGIC NEGATIVE: 1
NAUSEA: 0
BACK PAIN: 0
VOMITING: 0
AGITATION: 0
CHILLS: 0
CONFUSION: 0
NUMBNESS: 0
ENDOCRINE NEGATIVE: 1
HEMATOLOGIC/LYMPHATIC NEGATIVE: 1
DIARRHEA: 0
WHEEZING: 0
NECK STIFFNESS: 0
ADENOPATHY: 0
JOINT SWELLING: 0

## 2025-08-14 ENCOUNTER — HOSPITAL ENCOUNTER (OUTPATIENT)
Dept: RESPIRATORY THERAPY | Facility: HOSPITAL | Age: 60
Discharge: HOME | End: 2025-08-14
Payer: MEDICARE

## 2025-08-14 DIAGNOSIS — J44.9 CHRONIC OBSTRUCTIVE PULMONARY DISEASE, UNSPECIFIED COPD TYPE (MULTI): ICD-10-CM

## 2025-08-14 DIAGNOSIS — R06.09 DOE (DYSPNEA ON EXERTION): ICD-10-CM

## 2025-08-14 LAB
MGC ASCENT PFT - FEV1 - POST: 2.93
MGC ASCENT PFT - FEV1 - PRE: 2.74
MGC ASCENT PFT - FEV1 - PREDICTED: 3.34
MGC ASCENT PFT - FVC - POST: 5.07
MGC ASCENT PFT - FVC - PRE: 4.68
MGC ASCENT PFT - FVC - PREDICTED: 4.31

## 2025-08-14 PROCEDURE — 2500000001 HC RX 250 WO HCPCS SELF ADMINISTERED DRUGS (ALT 637 FOR MEDICARE OP): Performed by: INTERNAL MEDICINE

## 2025-08-14 PROCEDURE — 94726 PLETHYSMOGRAPHY LUNG VOLUMES: CPT

## 2025-08-14 RX ORDER — ALBUTEROL SULFATE 90 UG/1
4 INHALANT RESPIRATORY (INHALATION) ONCE
Status: COMPLETED | OUTPATIENT
Start: 2025-08-14 | End: 2025-08-14

## 2025-08-14 RX ORDER — ALBUTEROL SULFATE 0.83 MG/ML
3 SOLUTION RESPIRATORY (INHALATION) ONCE
Status: COMPLETED | OUTPATIENT
Start: 2025-08-14 | End: 2025-08-14

## 2025-08-14 RX ADMIN — ALBUTEROL SULFATE 4 PUFF: 90 AEROSOL, METERED RESPIRATORY (INHALATION) at 11:17

## 2025-08-26 ENCOUNTER — HOSPITAL ENCOUNTER (OUTPATIENT)
Dept: OPERATING ROOM | Facility: HOSPITAL | Age: 60
Setting detail: OUTPATIENT SURGERY
Discharge: HOME | End: 2025-08-26
Payer: MEDICARE

## 2025-08-26 VITALS
OXYGEN SATURATION: 98 % | TEMPERATURE: 98.1 F | WEIGHT: 188.8 LBS | BODY MASS INDEX: 27.03 KG/M2 | HEART RATE: 62 BPM | RESPIRATION RATE: 16 BRPM | SYSTOLIC BLOOD PRESSURE: 157 MMHG | DIASTOLIC BLOOD PRESSURE: 85 MMHG | HEIGHT: 70 IN

## 2025-08-26 DIAGNOSIS — M54.17 LUMBOSACRAL RADICULOPATHY: ICD-10-CM

## 2025-08-26 PROCEDURE — 2500000005 HC RX 250 GENERAL PHARMACY W/O HCPCS: Performed by: STUDENT IN AN ORGANIZED HEALTH CARE EDUCATION/TRAINING PROGRAM

## 2025-08-26 PROCEDURE — 7100000009 HC PHASE TWO TIME - INITIAL BASE CHARGE

## 2025-08-26 PROCEDURE — 2500000004 HC RX 250 GENERAL PHARMACY W/ HCPCS (ALT 636 FOR OP/ED)

## 2025-08-26 PROCEDURE — 2500000004 HC RX 250 GENERAL PHARMACY W/ HCPCS (ALT 636 FOR OP/ED): Mod: JZ | Performed by: STUDENT IN AN ORGANIZED HEALTH CARE EDUCATION/TRAINING PROGRAM

## 2025-08-26 PROCEDURE — 2550000001 HC RX 255 CONTRASTS

## 2025-08-26 PROCEDURE — 7100000010 HC PHASE TWO TIME - EACH INCREMENTAL 1 MINUTE

## 2025-08-26 PROCEDURE — 62323 NJX INTERLAMINAR LMBR/SAC: CPT | Performed by: STUDENT IN AN ORGANIZED HEALTH CARE EDUCATION/TRAINING PROGRAM

## 2025-08-26 RX ORDER — LIDOCAINE HYDROCHLORIDE 20 MG/ML
6 INJECTION, SOLUTION EPIDURAL; INFILTRATION; INTRACAUDAL; PERINEURAL ONCE
Status: COMPLETED | OUTPATIENT
Start: 2025-08-26 | End: 2025-08-26

## 2025-08-26 RX ORDER — METHYLPREDNISOLONE ACETATE 40 MG/ML
40 INJECTION, SUSPENSION INTRA-ARTICULAR; INTRALESIONAL; INTRAMUSCULAR; SOFT TISSUE ONCE
Status: DISCONTINUED | OUTPATIENT
Start: 2025-08-26 | End: 2025-08-27 | Stop reason: HOSPADM

## 2025-08-26 RX ORDER — SODIUM CHLORIDE 9 MG/ML
INJECTION, SOLUTION INTRAMUSCULAR; INTRAVENOUS; SUBCUTANEOUS AS NEEDED
Status: COMPLETED | OUTPATIENT
Start: 2025-08-26 | End: 2025-08-26

## 2025-08-26 RX ORDER — LIDOCAINE HYDROCHLORIDE 20 MG/ML
INJECTION, SOLUTION EPIDURAL; INFILTRATION; INTRACAUDAL; PERINEURAL AS NEEDED
Status: COMPLETED | OUTPATIENT
Start: 2025-08-26 | End: 2025-08-26

## 2025-08-26 RX ORDER — METHYLPREDNISOLONE ACETATE 40 MG/ML
INJECTION, SUSPENSION INTRA-ARTICULAR; INTRALESIONAL; INTRAMUSCULAR; SOFT TISSUE AS NEEDED
Status: COMPLETED | OUTPATIENT
Start: 2025-08-26 | End: 2025-08-26

## 2025-08-26 RX ORDER — SODIUM CHLORIDE 9 MG/ML
4 INJECTION, SOLUTION INTRAMUSCULAR; INTRAVENOUS; SUBCUTANEOUS ONCE
Status: DISCONTINUED | OUTPATIENT
Start: 2025-08-26 | End: 2025-08-27 | Stop reason: HOSPADM

## 2025-08-26 RX ADMIN — IOHEXOL 1.5 ML: 300 INJECTION, SOLUTION INTRAVENOUS at 12:17

## 2025-08-26 RX ADMIN — METHYLPREDNISOLONE ACETATE 40 MG: 40 INJECTION, SUSPENSION INTRA-ARTICULAR; INTRALESIONAL; INTRAMUSCULAR; SOFT TISSUE at 12:18

## 2025-08-26 RX ADMIN — SODIUM CHLORIDE 4 ML: 9 INJECTION INTRAMUSCULAR; INTRAVENOUS; SUBCUTANEOUS at 12:18

## 2025-08-26 RX ADMIN — LIDOCAINE HYDROCHLORIDE 2 ML: 20 INJECTION, SOLUTION EPIDURAL; INFILTRATION; INTRACAUDAL; PERINEURAL at 12:16

## 2025-08-26 RX ADMIN — LIDOCAINE HYDROCHLORIDE 1 ML: 20 INJECTION, SOLUTION EPIDURAL; INFILTRATION; INTRACAUDAL; PERINEURAL at 12:17

## 2025-08-26 ASSESSMENT — PAIN DESCRIPTION - DESCRIPTORS: DESCRIPTORS: THROBBING

## 2025-08-26 ASSESSMENT — PAIN SCALES - GENERAL
PAINLEVEL_OUTOF10: 7
PAINLEVEL_OUTOF10: 2

## 2025-08-26 ASSESSMENT — PAIN - FUNCTIONAL ASSESSMENT
PAIN_FUNCTIONAL_ASSESSMENT: 0-10
PAIN_FUNCTIONAL_ASSESSMENT: 0-10

## 2025-08-27 ENCOUNTER — HOSPITAL ENCOUNTER (OUTPATIENT)
Dept: RADIOLOGY | Facility: HOSPITAL | Age: 60
Discharge: HOME | End: 2025-08-27
Payer: MEDICARE

## 2025-08-27 ENCOUNTER — HOSPITAL ENCOUNTER (OUTPATIENT)
Dept: CARDIOLOGY | Facility: HOSPITAL | Age: 60
Discharge: HOME | End: 2025-08-27
Payer: MEDICARE

## 2025-08-27 DIAGNOSIS — R07.9 CHEST PAIN, UNSPECIFIED TYPE: ICD-10-CM

## 2025-08-27 DIAGNOSIS — R06.09 DOE (DYSPNEA ON EXERTION): ICD-10-CM

## 2025-08-27 DIAGNOSIS — J44.9 CHRONIC OBSTRUCTIVE PULMONARY DISEASE, UNSPECIFIED COPD TYPE (MULTI): ICD-10-CM

## 2025-08-27 DIAGNOSIS — R06.02 SHORTNESS OF BREATH: ICD-10-CM

## 2025-08-27 DIAGNOSIS — R55 SYNCOPE, UNSPECIFIED SYNCOPE TYPE: ICD-10-CM

## 2025-08-27 LAB
AORTIC VALVE MEAN GRADIENT: 3 MMHG
AORTIC VALVE PEAK VELOCITY: 1.25 M/S
AV PEAK GRADIENT: 6 MMHG
AVA (PEAK VEL): 2.27 CM2
AVA (VTI): 2.33 CM2
EJECTION FRACTION APICAL 4 CHAMBER: 78.9
EJECTION FRACTION: 63 %
LEFT ATRIUM VOLUME AREA LENGTH INDEX BSA: 16 ML/M2
LEFT VENTRICLE INTERNAL DIMENSION DIASTOLE: 4.23 CM (ref 3.5–6)
LEFT VENTRICULAR OUTFLOW TRACT DIAMETER: 1.9 CM
LV EJECTION FRACTION BIPLANE: 70 %
MITRAL VALVE E/A RATIO: 0.75
RIGHT VENTRICLE PEAK SYSTOLIC PRESSURE: 21 MMHG
TRICUSPID ANNULAR PLANE SYSTOLIC EXCURSION: 2.7 CM

## 2025-08-27 PROCEDURE — 9420000001 HC RT PATIENT EDUCATION 5 MIN

## 2025-08-27 PROCEDURE — 93018 CV STRESS TEST I&R ONLY: CPT | Performed by: INTERNAL MEDICINE

## 2025-08-27 PROCEDURE — 78452 HT MUSCLE IMAGE SPECT MULT: CPT

## 2025-08-27 PROCEDURE — 93306 TTE W/DOPPLER COMPLETE: CPT | Performed by: INTERNAL MEDICINE

## 2025-08-27 PROCEDURE — 71046 X-RAY EXAM CHEST 2 VIEWS: CPT

## 2025-08-27 PROCEDURE — 93016 CV STRESS TEST SUPVJ ONLY: CPT | Performed by: INTERNAL MEDICINE

## 2025-08-27 PROCEDURE — 78452 HT MUSCLE IMAGE SPECT MULT: CPT | Performed by: RADIOLOGY

## 2025-08-27 PROCEDURE — 2500000004 HC RX 250 GENERAL PHARMACY W/ HCPCS (ALT 636 FOR OP/ED): Performed by: INTERNAL MEDICINE

## 2025-08-27 PROCEDURE — A9502 TC99M TETROFOSMIN: HCPCS | Performed by: INTERNAL MEDICINE

## 2025-08-27 PROCEDURE — 71046 X-RAY EXAM CHEST 2 VIEWS: CPT | Performed by: STUDENT IN AN ORGANIZED HEALTH CARE EDUCATION/TRAINING PROGRAM

## 2025-08-27 PROCEDURE — 3430000001 HC RX 343 DIAGNOSTIC RADIOPHARMACEUTICALS: Performed by: INTERNAL MEDICINE

## 2025-08-27 PROCEDURE — 93306 TTE W/DOPPLER COMPLETE: CPT

## 2025-08-27 PROCEDURE — 93017 CV STRESS TEST TRACING ONLY: CPT

## 2025-08-27 PROCEDURE — 93242 EXT ECG>48HR<7D RECORDING: CPT

## 2025-08-27 RX ORDER — REGADENOSON 0.08 MG/ML
0.4 INJECTION, SOLUTION INTRAVENOUS ONCE
Status: COMPLETED | OUTPATIENT
Start: 2025-08-27 | End: 2025-08-27

## 2025-08-27 RX ADMIN — TETROFOSMIN 35 MILLICURIE: 0.23 INJECTION, POWDER, LYOPHILIZED, FOR SOLUTION INTRAVENOUS at 07:32

## 2025-08-27 RX ADMIN — TETROFOSMIN 10 MILLICURIE: 0.23 INJECTION, POWDER, LYOPHILIZED, FOR SOLUTION INTRAVENOUS at 06:22

## 2025-08-27 RX ADMIN — REGADENOSON 0.4 MG: 0.08 INJECTION, SOLUTION INTRAVENOUS at 07:28

## 2025-09-03 ENCOUNTER — OUTSIDE PROCEDURE (OUTPATIENT)
Dept: PRIMARY CARE | Facility: CLINIC | Age: 60
End: 2025-09-03
Payer: MEDICARE

## 2025-09-03 DIAGNOSIS — R55 SYNCOPE, UNSPECIFIED SYNCOPE TYPE: Primary | ICD-10-CM

## 2025-09-03 PROCEDURE — 93244 EXT ECG>48HR<7D REV&INTERPJ: CPT | Performed by: INTERNAL MEDICINE

## 2025-09-09 ENCOUNTER — APPOINTMENT (OUTPATIENT)
Dept: PRIMARY CARE | Facility: CLINIC | Age: 60
End: 2025-09-09
Payer: MEDICARE

## 2025-09-15 ENCOUNTER — APPOINTMENT (OUTPATIENT)
Dept: PAIN MEDICINE | Facility: CLINIC | Age: 60
End: 2025-09-15
Payer: MEDICARE